# Patient Record
Sex: MALE | Race: WHITE | Employment: FULL TIME | URBAN - NONMETROPOLITAN AREA
[De-identification: names, ages, dates, MRNs, and addresses within clinical notes are randomized per-mention and may not be internally consistent; named-entity substitution may affect disease eponyms.]

---

## 2018-01-04 ENCOUNTER — HOSPITAL ENCOUNTER (EMERGENCY)
Age: 28
Discharge: HOME | End: 2018-01-04
Payer: MEDICARE

## 2018-01-04 VITALS
DIASTOLIC BLOOD PRESSURE: 84 MMHG | HEART RATE: 66 BPM | WEIGHT: 190 LBS | HEIGHT: 73 IN | TEMPERATURE: 98.4 F | BODY MASS INDEX: 25.18 KG/M2 | SYSTOLIC BLOOD PRESSURE: 144 MMHG | RESPIRATION RATE: 12 BRPM | OXYGEN SATURATION: 96 %

## 2018-01-04 DIAGNOSIS — R19.7 NAUSEA VOMITING AND DIARRHEA: ICD-10-CM

## 2018-01-04 DIAGNOSIS — K52.9 GASTROENTERITIS: Primary | ICD-10-CM

## 2018-01-04 DIAGNOSIS — R11.2 NAUSEA VOMITING AND DIARRHEA: ICD-10-CM

## 2018-01-04 PROCEDURE — 99203 OFFICE O/P NEW LOW 30 MIN: CPT | Performed by: NURSE PRACTITIONER

## 2018-01-04 PROCEDURE — 99203 OFFICE O/P NEW LOW 30 MIN: CPT

## 2018-01-04 RX ORDER — ONDANSETRON 4 MG/1
4 TABLET, FILM COATED ORAL EVERY 8 HOURS PRN
COMMUNITY
End: 2018-12-18 | Stop reason: ALTCHOICE

## 2018-01-04 ASSESSMENT — ENCOUNTER SYMPTOMS
SORE THROAT: 0
VOMITING: 1
NAUSEA: 1
RHINORRHEA: 0
DIARRHEA: 1
SINUS PRESSURE: 0
SHORTNESS OF BREATH: 0
ABDOMINAL PAIN: 1
CHEST TIGHTNESS: 0
COUGH: 0

## 2018-12-18 ENCOUNTER — HOSPITAL ENCOUNTER (EMERGENCY)
Age: 28
Discharge: HOME | End: 2018-12-18
Payer: MEDICARE

## 2018-12-18 VITALS
OXYGEN SATURATION: 96 % | SYSTOLIC BLOOD PRESSURE: 145 MMHG | HEART RATE: 124 BPM | BODY MASS INDEX: 29.16 KG/M2 | WEIGHT: 220 LBS | TEMPERATURE: 100.3 F | DIASTOLIC BLOOD PRESSURE: 91 MMHG | RESPIRATION RATE: 16 BRPM | HEIGHT: 73 IN

## 2018-12-18 DIAGNOSIS — J03.90 EXUDATIVE TONSILLITIS: Primary | ICD-10-CM

## 2018-12-18 LAB
GROUP A STREP CULTURE, REFLEX: NEGATIVE
REFLEX THROAT C + S: NORMAL

## 2018-12-18 PROCEDURE — 99213 OFFICE O/P EST LOW 20 MIN: CPT | Performed by: NURSE PRACTITIONER

## 2018-12-18 PROCEDURE — 87070 CULTURE OTHR SPECIMN AEROBIC: CPT

## 2018-12-18 PROCEDURE — 99213 OFFICE O/P EST LOW 20 MIN: CPT

## 2018-12-18 RX ORDER — AMOXICILLIN AND CLAVULANATE POTASSIUM 875; 125 MG/1; MG/1
1 TABLET, FILM COATED ORAL 2 TIMES DAILY
Qty: 20 TABLET | Refills: 0 | Status: SHIPPED | OUTPATIENT
Start: 2018-12-18 | End: 2018-12-28

## 2018-12-18 ASSESSMENT — ENCOUNTER SYMPTOMS
DIARRHEA: 0
WHEEZING: 0
SHORTNESS OF BREATH: 0
VOMITING: 0
SORE THROAT: 1
NAUSEA: 0

## 2018-12-18 ASSESSMENT — PAIN DESCRIPTION - FREQUENCY: FREQUENCY: CONTINUOUS

## 2018-12-18 ASSESSMENT — PAIN DESCRIPTION - DESCRIPTORS: DESCRIPTORS: SHARP

## 2018-12-18 ASSESSMENT — PAIN DESCRIPTION - PAIN TYPE: TYPE: ACUTE PAIN

## 2018-12-18 ASSESSMENT — PAIN DESCRIPTION - ONSET: ONSET: GRADUAL

## 2018-12-18 ASSESSMENT — PAIN DESCRIPTION - PROGRESSION: CLINICAL_PROGRESSION: GRADUALLY WORSENING

## 2018-12-18 ASSESSMENT — PAIN SCALES - GENERAL: PAINLEVEL_OUTOF10: 8

## 2018-12-18 ASSESSMENT — PAIN DESCRIPTION - LOCATION: LOCATION: THROAT

## 2018-12-18 NOTE — ED PROVIDER NOTES
edit the dictations but occasionally words are mis-transcribed.)         [place] Case, APRN - CNP  12/19/18 0020

## 2018-12-18 NOTE — LETTER
[facility]  [address]  Phone: [phone]               December 18, 2018    Patient: Fili Postal   YOB: 1990   Date of Visit: 12/18/2018       To Whom It May Concern:    Jw Smith was seen and treated in our emergency department on 12/18/2018. He may return to work on 12/20/18. Or sooner if no fever for 24 hours.       Sincerely,       Sergei Lux RN         Signature:__Electronically signed by Sergei Lux RN on 12/18/2018 at 6:15 PM  ________________________________

## 2018-12-19 ASSESSMENT — ENCOUNTER SYMPTOMS
COUGH: 0
SINUS PRESSURE: 0

## 2018-12-20 LAB — THROAT/NOSE CULTURE: NORMAL

## 2019-02-22 ENCOUNTER — HOSPITAL ENCOUNTER (EMERGENCY)
Age: 29
Discharge: HOME | End: 2019-02-22
Payer: MEDICARE

## 2019-02-22 VITALS
TEMPERATURE: 98 F | BODY MASS INDEX: 29.16 KG/M2 | RESPIRATION RATE: 16 BRPM | HEIGHT: 73 IN | WEIGHT: 220 LBS | DIASTOLIC BLOOD PRESSURE: 76 MMHG | SYSTOLIC BLOOD PRESSURE: 155 MMHG | HEART RATE: 88 BPM | OXYGEN SATURATION: 96 %

## 2019-02-22 DIAGNOSIS — J01.10 ACUTE FRONTAL SINUSITIS, RECURRENCE NOT SPECIFIED: ICD-10-CM

## 2019-02-22 DIAGNOSIS — J01.00 ACUTE MAXILLARY SINUSITIS, RECURRENCE NOT SPECIFIED: Primary | ICD-10-CM

## 2019-02-22 PROCEDURE — 99212 OFFICE O/P EST SF 10 MIN: CPT

## 2019-02-22 PROCEDURE — 99213 OFFICE O/P EST LOW 20 MIN: CPT | Performed by: NURSE PRACTITIONER

## 2019-02-22 RX ORDER — ACETAMINOPHEN 500 MG
1000 TABLET ORAL EVERY 6 HOURS PRN
COMMUNITY
End: 2019-07-01 | Stop reason: ALTCHOICE

## 2019-02-22 RX ORDER — PREDNISONE 20 MG/1
20 TABLET ORAL 2 TIMES DAILY
Qty: 10 TABLET | Refills: 0 | Status: SHIPPED | OUTPATIENT
Start: 2019-02-22 | End: 2019-02-27

## 2019-02-22 RX ORDER — AMOXICILLIN AND CLAVULANATE POTASSIUM 875; 125 MG/1; MG/1
1 TABLET, FILM COATED ORAL 2 TIMES DAILY
Qty: 14 TABLET | Refills: 0 | Status: SHIPPED | OUTPATIENT
Start: 2019-02-22 | End: 2019-03-01

## 2019-02-22 ASSESSMENT — ENCOUNTER SYMPTOMS
SORE THROAT: 1
NAUSEA: 0
SINUS PAIN: 1
SHORTNESS OF BREATH: 0
RHINORRHEA: 0
SINUS PRESSURE: 1
DIARRHEA: 0
COUGH: 1
VOMITING: 0

## 2019-02-22 ASSESSMENT — PAIN DESCRIPTION - LOCATION: LOCATION: THROAT

## 2019-02-22 ASSESSMENT — PAIN DESCRIPTION - PAIN TYPE: TYPE: ACUTE PAIN

## 2019-02-22 ASSESSMENT — PAIN DESCRIPTION - FREQUENCY: FREQUENCY: CONTINUOUS

## 2019-02-22 ASSESSMENT — PAIN DESCRIPTION - ONSET: ONSET: GRADUAL

## 2019-02-22 ASSESSMENT — PAIN DESCRIPTION - PROGRESSION: CLINICAL_PROGRESSION: NOT CHANGED

## 2019-02-22 ASSESSMENT — PAIN SCALES - GENERAL: PAINLEVEL_OUTOF10: 6

## 2019-02-22 ASSESSMENT — PAIN DESCRIPTION - DESCRIPTORS: DESCRIPTORS: SORE;ITCHING

## 2019-02-22 ASSESSMENT — PAIN - FUNCTIONAL ASSESSMENT: PAIN_FUNCTIONAL_ASSESSMENT: ACTIVITIES ARE NOT PREVENTED

## 2019-05-09 ENCOUNTER — HOSPITAL ENCOUNTER (EMERGENCY)
Age: 29
Discharge: HOME | End: 2019-05-09
Payer: MEDICARE

## 2019-05-09 VITALS
BODY MASS INDEX: 29.82 KG/M2 | HEIGHT: 73 IN | OXYGEN SATURATION: 98 % | RESPIRATION RATE: 16 BRPM | SYSTOLIC BLOOD PRESSURE: 123 MMHG | HEART RATE: 76 BPM | WEIGHT: 225 LBS | DIASTOLIC BLOOD PRESSURE: 76 MMHG | TEMPERATURE: 98.3 F

## 2019-05-09 DIAGNOSIS — J03.90 EXUDATIVE TONSILLITIS: Primary | ICD-10-CM

## 2019-05-09 LAB
GROUP A STREP CULTURE, REFLEX: NEGATIVE
REFLEX THROAT C + S: NORMAL

## 2019-05-09 PROCEDURE — 99213 OFFICE O/P EST LOW 20 MIN: CPT

## 2019-05-09 PROCEDURE — 99213 OFFICE O/P EST LOW 20 MIN: CPT | Performed by: NURSE PRACTITIONER

## 2019-05-09 PROCEDURE — 87880 STREP A ASSAY W/OPTIC: CPT

## 2019-05-09 PROCEDURE — 87070 CULTURE OTHR SPECIMN AEROBIC: CPT

## 2019-05-09 RX ORDER — AMOXICILLIN 500 MG/1
500 CAPSULE ORAL 2 TIMES DAILY
Qty: 20 CAPSULE | Refills: 0 | Status: SHIPPED | OUTPATIENT
Start: 2019-05-09 | End: 2019-05-19

## 2019-05-09 ASSESSMENT — ENCOUNTER SYMPTOMS
SINUS PAIN: 0
RHINORRHEA: 0
COUGH: 0
NAUSEA: 0
SINUS PRESSURE: 0
VOMITING: 0
SHORTNESS OF BREATH: 0
SORE THROAT: 1
DIARRHEA: 0

## 2019-05-09 NOTE — ED PROVIDER NOTES
[address]  Urgent Care Encounter       CHIEF COMPLAINT       Chief Complaint   Patient presents with    Pharyngitis       Nurses Notes reviewed and I agree except as noted in the HPI. HISTORY OF PRESENT ILLNESS   Darwin Valles is a 34 y.o. male who presents with complaints of sore throat for the past 2 days. Patient also reports subjective fever with body aches and chills. No reports of cough, sinus congestion, otalgia, nausea, vomiting or diarrhea. Appetite has been normal however he has decreased intake due to pain with swallowing. The history is provided by the patient. REVIEW OF SYSTEMS     Review of Systems   Constitutional: Positive for fever (Subjective). Negative for appetite change, chills and fatigue. HENT: Positive for sore throat. Negative for congestion, ear pain, rhinorrhea, sinus pressure, sinus pain and sneezing. Respiratory: Negative for cough and shortness of breath. Cardiovascular: Negative for chest pain. Gastrointestinal: Negative for diarrhea, nausea and vomiting. Musculoskeletal: Positive for myalgias. Neurological: Positive for headaches (Occasional). Negative for dizziness. PAST MEDICAL HISTORY   History reviewed. No pertinent past medical history. SURGICALHISTORY     Patient  has a past surgical history that includes Tibia fracture surgery and back surgery. CURRENT MEDICATIONS       Discharge Medication List as of 5/9/2019 11:06 AM      CONTINUE these medications which have NOT CHANGED    Details   Pseudoephedrine-APAP-DM (DAYQUIL PO) Take by mouthHistorical Med      acetaminophen (TYLENOL) 500 MG tablet Take 1,000 mg by mouth every 6 hours as needed for PainHistorical Med             ALLERGIES     Patient is is allergic to morphine. Patients   There is no immunization history on file for this patient. FAMILY HISTORY     Patient's family history is not on file.     SOCIAL HISTORY     Patient  reports that he has been smoking cigarettes. He has a 1.25 pack-year smoking history. He has never used smokeless tobacco. He reports that he drinks alcohol. He reports that he does not use drugs. PHYSICAL EXAM     ED TRIAGE VITALS  BP: 123/76, Temp: 98.3 °F (36.8 °C), Pulse: 76, Resp: 16, SpO2: 98 %,Estimated body mass index is 29.69 kg/m² as calculated from the following:    Height as of this encounter: 6' 1\" (1.854 m). Weight as of this encounter: 225 lb (102.1 kg). ,No LMP for male patient. Physical Exam   Constitutional: He is oriented to person, place, and time. Vital signs are normal. He appears well-developed and well-nourished. He is cooperative. He does not appear ill. HENT:   Head: Normocephalic and atraumatic. Right Ear: Hearing, tympanic membrane, external ear and ear canal normal.   Left Ear: Hearing, tympanic membrane, external ear and ear canal normal.   Nose: Nose normal. Right sinus exhibits no maxillary sinus tenderness and no frontal sinus tenderness. Left sinus exhibits no maxillary sinus tenderness and no frontal sinus tenderness. Mouth/Throat: Uvula is midline and mucous membranes are normal. Oropharyngeal exudate, posterior oropharyngeal edema and posterior oropharyngeal erythema present. No tonsillar abscesses. Tonsils are 3+ on the right. Tonsils are 3+ on the left. Tonsillar exudate. Neck: Neck supple. Cardiovascular: Normal rate, regular rhythm and normal heart sounds. Pulmonary/Chest: Effort normal and breath sounds normal. No respiratory distress. Lymphadenopathy:        Head (right side): No submental, no submandibular, no tonsillar, no preauricular, no posterior auricular and no occipital adenopathy present. Head (left side): No submental, no submandibular, no tonsillar, no preauricular, no posterior auricular and no occipital adenopathy present. He has no cervical adenopathy. Neurological: He is alert and oriented to person, place, and time. Skin: Skin is warm and dry. Psychiatric: He has a normal mood and affect. His speech is normal and behavior is normal.   Nursing note and vitals reviewed. DIAGNOSTIC RESULTS     Labs:  Results for orders placed or performed during the hospital encounter of 05/09/19   STREP A ANTIGEN   Result Value Ref Range    GROUP A STREP CULTURE, REFLEX NEGATIVE        IMAGING:    No orders to display         EKG:      URGENT CARE COURSE:     Vitals:    05/09/19 1037   BP: 123/76   Pulse: 76   Resp: 16   Temp: 98.3 °F (36.8 °C)   TempSrc: Temporal   SpO2: 98%   Weight: 225 lb (102.1 kg)   Height: 6' 1\" (1.854 m)       Medications - No data to display         PROCEDURES:  None    FINAL IMPRESSION      1. Exudative tonsillitis          DISPOSITION/ PLAN     Take antibiotics as prescribed until gone. Do not stop taking if even you start feeling better. Salt water gargles and/or throat spray/lozenges for pain relief. Do not share food, drinks, or utensils. Change tooth brush now and change again when symptoms are gone  Saline rinses for sinus congestion  Tylenol or Motrin for pain/fever/aches  Follow up with the [facility] [place] or with your family doctor in 3-4 days as needed if symptoms are not improving. Follow-up with family doctor to discuss possible ENT referral.    Patient has acute tonsillitis with exudate noted. Strep screen was negative. Patient will be treated with amoxicillin ×10 days. Follow-up with PCP in the next 3 days if not improved. Also discuss with PCP possible referral to ENT. Further instructions outlined above. All the patient's questions answered. The patient/parent agreed with the plan. The patient was discharged from the [facility] in good condition.         PATIENT REFERRED TO:  Jolie Fuentes MD  [address] / [place] [id number]      DISCHARGE MEDICATIONS:  Discharge Medication List as of 5/9/2019 11:06 AM      START taking these medications    Details   amoxicillin (AMOXIL) 500 MG capsule Take 1 capsule by mouth 2 times daily for 10 days, Disp-20 capsule, R-0Normal             Discharge Medication List as of 5/9/2019 11:06 AM          Discharge Medication List as of 5/9/2019 11:06 AM          Jessica Gui Cunningham, APRN - CNP    (Please note that portions of this note were completed with a voice recognition program. Efforts were made to edit the dictations but occasionally words are mis-transcribed.)         Jessica Gui Cunningham, APRN - CARMELA  05/09/19 1102

## 2019-05-11 LAB — THROAT/NOSE CULTURE: NORMAL

## 2019-07-01 ENCOUNTER — OFFICE VISIT (OUTPATIENT)
Dept: ENT CLINIC | Age: 29
End: 2019-07-01
Payer: MEDICARE

## 2019-07-01 VITALS
TEMPERATURE: 98.6 F | HEIGHT: 73 IN | HEART RATE: 76 BPM | BODY MASS INDEX: 29.94 KG/M2 | RESPIRATION RATE: 16 BRPM | WEIGHT: 225.9 LBS | SYSTOLIC BLOOD PRESSURE: 126 MMHG | DIASTOLIC BLOOD PRESSURE: 88 MMHG

## 2019-07-01 DIAGNOSIS — J35.8 TONSIL STONE: ICD-10-CM

## 2019-07-01 DIAGNOSIS — R19.6 HALITOSIS: ICD-10-CM

## 2019-07-01 DIAGNOSIS — J35.1 TONSILLAR HYPERTROPHY: ICD-10-CM

## 2019-07-01 DIAGNOSIS — J03.91 RECURRENT TONSILLITIS: Primary | ICD-10-CM

## 2019-07-01 PROCEDURE — 4004F PT TOBACCO SCREEN RCVD TLK: CPT | Performed by: NURSE PRACTITIONER

## 2019-07-01 PROCEDURE — G8427 DOCREV CUR MEDS BY ELIG CLIN: HCPCS | Performed by: NURSE PRACTITIONER

## 2019-07-01 PROCEDURE — 99204 OFFICE O/P NEW MOD 45 MIN: CPT | Performed by: NURSE PRACTITIONER

## 2019-07-01 PROCEDURE — G8419 CALC BMI OUT NRM PARAM NOF/U: HCPCS | Performed by: NURSE PRACTITIONER

## 2019-07-01 ASSESSMENT — ENCOUNTER SYMPTOMS
FACIAL SWELLING: 0
STRIDOR: 0
VOICE CHANGE: 0
SHORTNESS OF BREATH: 0
APNEA: 0
SORE THROAT: 0
SINUS PRESSURE: 0
VOMITING: 0
CHEST TIGHTNESS: 0
COLOR CHANGE: 0
CHOKING: 0
TROUBLE SWALLOWING: 0
WHEEZING: 0
DIARRHEA: 0
ABDOMINAL PAIN: 0
NAUSEA: 0
COUGH: 0
RHINORRHEA: 0

## 2019-07-02 ENCOUNTER — TELEPHONE (OUTPATIENT)
Dept: ENT CLINIC | Age: 29
End: 2019-07-02

## 2019-07-08 ENCOUNTER — TELEPHONE (OUTPATIENT)
Dept: ENT CLINIC | Age: 29
End: 2019-07-08

## 2019-07-10 DIAGNOSIS — Z01.818 PRE-OP TESTING: Primary | ICD-10-CM

## 2019-07-10 DIAGNOSIS — F17.200 SMOKER: ICD-10-CM

## 2019-07-11 ENCOUNTER — HOSPITAL ENCOUNTER (OUTPATIENT)
Age: 29
Discharge: HOME | End: 2019-07-11
Payer: MEDICARE

## 2019-07-11 ENCOUNTER — HOSPITAL ENCOUNTER (OUTPATIENT)
Dept: GENERAL RADIOLOGY | Age: 29
Discharge: HOME | End: 2019-07-11
Payer: MEDICARE

## 2019-07-11 DIAGNOSIS — F17.200 SMOKER: ICD-10-CM

## 2019-07-11 DIAGNOSIS — Z01.818 PRE-OP TESTING: ICD-10-CM

## 2019-07-11 LAB
BASOPHILS # BLD: 0.5 %
BASOPHILS ABSOLUTE: 0 THOU/MM3 (ref 0–0.1)
EOSINOPHIL # BLD: 2.6 %
EOSINOPHILS ABSOLUTE: 0.2 THOU/MM3 (ref 0–0.4)
ERYTHROCYTE [DISTWIDTH] IN BLOOD BY AUTOMATED COUNT: 12 % (ref 11.5–14.5)
ERYTHROCYTE [DISTWIDTH] IN BLOOD BY AUTOMATED COUNT: 38.6 FL (ref 35–45)
HCT VFR BLD CALC: 44.8 % (ref 42–52)
HEMOGLOBIN: 15.6 GM/DL (ref 14–18)
IMMATURE GRANS (ABS): 0.01 THOU/MM3 (ref 0–0.07)
IMMATURE GRANULOCYTES: 0.2 %
LYMPHOCYTES # BLD: 27.9 %
LYMPHOCYTES ABSOLUTE: 1.8 THOU/MM3 (ref 1–4.8)
MCH RBC QN AUTO: 30.8 PG (ref 26–33)
MCHC RBC AUTO-ENTMCNC: 34.8 GM/DL (ref 32.2–35.5)
MCV RBC AUTO: 88.5 FL (ref 80–94)
MONOCYTES # BLD: 6.6 %
MONOCYTES ABSOLUTE: 0.4 THOU/MM3 (ref 0.4–1.3)
NUCLEATED RED BLOOD CELLS: 0 /100 WBC
PLATELET # BLD: 209 THOU/MM3 (ref 130–400)
PMV BLD AUTO: 10.8 FL (ref 9.4–12.4)
RBC # BLD: 5.06 MILL/MM3 (ref 4.7–6.1)
SEG NEUTROPHILS: 62.2 %
SEGMENTED NEUTROPHILS ABSOLUTE COUNT: 4.1 THOU/MM3 (ref 1.8–7.7)
WBC # BLD: 6.6 THOU/MM3 (ref 4.8–10.8)

## 2019-07-11 PROCEDURE — 85025 COMPLETE CBC W/AUTO DIFF WBC: CPT

## 2019-07-11 PROCEDURE — 36415 COLL VENOUS BLD VENIPUNCTURE: CPT

## 2019-07-11 PROCEDURE — 71046 X-RAY EXAM CHEST 2 VIEWS: CPT

## 2019-07-12 ENCOUNTER — OFFICE VISIT (OUTPATIENT)
Dept: ENT CLINIC | Age: 29
End: 2019-07-12

## 2019-07-12 VITALS
BODY MASS INDEX: 29.69 KG/M2 | DIASTOLIC BLOOD PRESSURE: 70 MMHG | HEART RATE: 80 BPM | HEIGHT: 73 IN | TEMPERATURE: 98.4 F | RESPIRATION RATE: 14 BRPM | SYSTOLIC BLOOD PRESSURE: 122 MMHG | WEIGHT: 224 LBS

## 2019-07-12 DIAGNOSIS — J03.91 RECURRENT TONSILLITIS: ICD-10-CM

## 2019-07-12 DIAGNOSIS — J35.1 TONSILLAR HYPERTROPHY: Primary | ICD-10-CM

## 2019-07-12 DIAGNOSIS — R19.6 HALITOSIS: ICD-10-CM

## 2019-07-12 DIAGNOSIS — F17.200 SMOKER: ICD-10-CM

## 2019-07-12 DIAGNOSIS — J35.8 TONSIL STONE: ICD-10-CM

## 2019-07-12 PROCEDURE — 99999 PR OFFICE/OUTPT VISIT,PROCEDURE ONLY: CPT | Performed by: OTOLARYNGOLOGY

## 2019-07-12 ASSESSMENT — ENCOUNTER SYMPTOMS
FACIAL SWELLING: 0
VOICE CHANGE: 0
RHINORRHEA: 0
CHEST TIGHTNESS: 0
VOMITING: 0
STRIDOR: 0
TROUBLE SWALLOWING: 0
CHOKING: 0
COUGH: 0
APNEA: 0
COLOR CHANGE: 0
SINUS PRESSURE: 0
ABDOMINAL PAIN: 0
WHEEZING: 0
DIARRHEA: 0
SORE THROAT: 0
SHORTNESS OF BREATH: 0
NAUSEA: 0

## 2019-07-12 NOTE — PROGRESS NOTES
family history,social history, allergies and current medications were reviewed with the patient. Assessment & Plan   Diagnoses and all orders for this visit:     Diagnosis Orders   1. Tonsillar hypertrophy     2. Recurrent tonsillitis     3. Tonsil stone     4. Halitosis     5. Smoker       The findings were explained and his questions were answered. Options were discussed including surgery to clear his airway. Probiotics were discussed in detail to take over the counter. He agreed. Benefits and risks are discussed, along with alternatives, and their questions were answered. No guarantees were made. They request we proceed. I, Samir Nunez CMA ([facility]), am scribing for, and in the presence of Dr. Scott Fallon. Electronically signed by Amber Thompson CMA ([facility]) on 7/12/19 at 2:32 PM.     (Please note that portions of this note were completed with a voice recognition program. Efforts were made to edit the dictations butoccasionally words are mis-transcribed.)    I agree to the above documentation placed by my scribe. I have personally evaluated this patient. Additional findings are as noted. I reviewed the scribe's note and agree with the documented findings and plan of care. Any areas of disagreement are corrected. I agree with the chief complaint, past medical history, past surgical history, allergies, medications, social and family history as documented unless otherwise noted below.      Electronically signed by Caitie Hart MD on 7/21/2019 at 5:26 PM

## 2019-07-15 NOTE — PROGRESS NOTES
PAT call attempted patient unavailable left message with instructions    NPO after midnight  Bring insurance info and drivers license  Wear comfortable clean clothing  Do not bring jewelry   Shower night before and morning of surgery with a liquid antibacterial soap  Bring list of medications with dosage and how often taken  Follow all instructions given by your physician   needed at discharge  Call [phone] for any questions

## 2019-07-18 ENCOUNTER — OFFICE VISIT (OUTPATIENT)
Dept: ALLERGY | Age: 29
End: 2019-07-18
Payer: MEDICARE

## 2019-07-18 ENCOUNTER — NURSE ONLY (OUTPATIENT)
Dept: LAB | Age: 29
End: 2019-07-18

## 2019-07-18 VITALS
DIASTOLIC BLOOD PRESSURE: 86 MMHG | SYSTOLIC BLOOD PRESSURE: 118 MMHG | WEIGHT: 224.5 LBS | HEART RATE: 68 BPM | HEIGHT: 73 IN | RESPIRATION RATE: 12 BRPM | BODY MASS INDEX: 29.75 KG/M2

## 2019-07-18 DIAGNOSIS — T63.91XA TOXIC EFFECT OF VENOM, ACCIDENTAL OR UNINTENTIONAL, INITIAL ENCOUNTER: ICD-10-CM

## 2019-07-18 DIAGNOSIS — J30.9 ALLERGIC RHINOCONJUNCTIVITIS: ICD-10-CM

## 2019-07-18 DIAGNOSIS — Z91.09 POLLEN ALLERGIES: ICD-10-CM

## 2019-07-18 DIAGNOSIS — J30.9 ALLERGIC RHINOCONJUNCTIVITIS: Primary | ICD-10-CM

## 2019-07-18 DIAGNOSIS — H10.10 SEASONAL AND PERENNIAL ALLERGIC RHINOCONJUNCTIVITIS: ICD-10-CM

## 2019-07-18 DIAGNOSIS — J30.1 ALLERGY TO TREES: ICD-10-CM

## 2019-07-18 DIAGNOSIS — J30.81 ALLERGY TO DOG DANDER: ICD-10-CM

## 2019-07-18 DIAGNOSIS — H10.10 ALLERGIC RHINOCONJUNCTIVITIS: Primary | ICD-10-CM

## 2019-07-18 DIAGNOSIS — Z91.038 ALLERGY TO COCKROACHES: ICD-10-CM

## 2019-07-18 DIAGNOSIS — J30.89 SEASONAL AND PERENNIAL ALLERGIC RHINOCONJUNCTIVITIS: ICD-10-CM

## 2019-07-18 DIAGNOSIS — H10.10 ALLERGIC RHINOCONJUNCTIVITIS: ICD-10-CM

## 2019-07-18 DIAGNOSIS — J30.2 SEASONAL AND PERENNIAL ALLERGIC RHINOCONJUNCTIVITIS: ICD-10-CM

## 2019-07-18 LAB
BASOPHILS # BLD: 0.7 %
BASOPHILS ABSOLUTE: 0 THOU/MM3 (ref 0–0.1)
EOSINOPHIL # BLD: 2.2 %
EOSINOPHILS ABSOLUTE: 0.1 THOU/MM3 (ref 0–0.4)
ERYTHROCYTE [DISTWIDTH] IN BLOOD BY AUTOMATED COUNT: 11.7 % (ref 11.5–14.5)
ERYTHROCYTE [DISTWIDTH] IN BLOOD BY AUTOMATED COUNT: 37.3 FL (ref 35–45)
HCT VFR BLD CALC: 44.2 % (ref 42–52)
HEMOGLOBIN: 15.5 GM/DL (ref 14–18)
IMMATURE GRANS (ABS): 0.01 THOU/MM3 (ref 0–0.07)
IMMATURE GRANULOCYTES: 0.2 %
LYMPHOCYTES # BLD: 27.7 %
LYMPHOCYTES ABSOLUTE: 1.5 THOU/MM3 (ref 1–4.8)
MCH RBC QN AUTO: 30.5 PG (ref 26–33)
MCHC RBC AUTO-ENTMCNC: 35.1 GM/DL (ref 32.2–35.5)
MCV RBC AUTO: 87 FL (ref 80–94)
MONOCYTES # BLD: 6.1 %
MONOCYTES ABSOLUTE: 0.3 THOU/MM3 (ref 0.4–1.3)
NUCLEATED RED BLOOD CELLS: 0 /100 WBC
PLATELET # BLD: 196 THOU/MM3 (ref 130–400)
PMV BLD AUTO: 10.6 FL (ref 9.4–12.4)
RBC # BLD: 5.08 MILL/MM3 (ref 4.7–6.1)
SEG NEUTROPHILS: 63.1 %
SEGMENTED NEUTROPHILS ABSOLUTE COUNT: 3.5 THOU/MM3 (ref 1.8–7.7)
WBC # BLD: 5.5 THOU/MM3 (ref 4.8–10.8)

## 2019-07-18 PROCEDURE — G8419 CALC BMI OUT NRM PARAM NOF/U: HCPCS | Performed by: NURSE PRACTITIONER

## 2019-07-18 PROCEDURE — 1036F TOBACCO NON-USER: CPT | Performed by: NURSE PRACTITIONER

## 2019-07-18 PROCEDURE — 99204 OFFICE O/P NEW MOD 45 MIN: CPT | Performed by: NURSE PRACTITIONER

## 2019-07-18 PROCEDURE — 95004 PERQ TESTS W/ALRGNC XTRCS: CPT | Performed by: NURSE PRACTITIONER

## 2019-07-18 PROCEDURE — G8427 DOCREV CUR MEDS BY ELIG CLIN: HCPCS | Performed by: NURSE PRACTITIONER

## 2019-07-18 RX ORDER — MONTELUKAST SODIUM 10 MG/1
10 TABLET ORAL NIGHTLY
Qty: 30 TABLET | Refills: 0 | Status: SHIPPED | OUTPATIENT
Start: 2019-07-18 | End: 2019-08-08 | Stop reason: SDUPTHER

## 2019-07-18 RX ORDER — LEVOCETIRIZINE DIHYDROCHLORIDE 5 MG/1
5 TABLET, FILM COATED ORAL NIGHTLY
Qty: 30 TABLET | Refills: 11 | COMMUNITY
Start: 2019-07-18 | End: 2021-07-01

## 2019-07-18 RX ORDER — TRIAMCINOLONE ACETONIDE 55 UG/1
2 SPRAY, METERED NASAL DAILY
Qty: 1 INHALER | Refills: 11 | Status: ON HOLD | COMMUNITY
Start: 2019-07-18 | End: 2019-07-22 | Stop reason: HOSPADM

## 2019-07-18 RX ORDER — EPINEPHRINE 0.3 MG/.3ML
INJECTION SUBCUTANEOUS
Qty: 4 EACH | Refills: 2 | Status: ON HOLD | OUTPATIENT
Start: 2019-07-18 | End: 2019-07-22 | Stop reason: HOSPADM

## 2019-07-18 ASSESSMENT — ENCOUNTER SYMPTOMS
CONSTIPATION: 0
STRIDOR: 0
SINUS PAIN: 0
EYE DISCHARGE: 0
EYE PAIN: 0
EYE REDNESS: 1
VOMITING: 0
SORE THROAT: 0
SINUS PRESSURE: 1
COUGH: 0
BACK PAIN: 0
EYE ITCHING: 1
SHORTNESS OF BREATH: 0
DIARRHEA: 0
RHINORRHEA: 1
NAUSEA: 0
WHEEZING: 0
ABDOMINAL PAIN: 0

## 2019-07-18 NOTE — PROGRESS NOTES
nasal inhaler 2 sprays by Each Nostril route daily Yes Soraidadeibis RoqueGOSIA CNP   EPINEPHrine (AUVI-Q) 0.3 MG/0.3ML SOAJ injection Dispense 2 packs of 2 (total 4 devices). Use as directed, STAT for allergic reaction. Yes Lupe Roque, APRN - CNP   Multiple Vitamins-Minerals (MULTIVITAMIN PO) Take by mouth daily  Historical Provider, MD       Vitals:  Vitals:    07/18/19 1016   BP: 118/86   Pulse: 68   Resp: 12       Physical Exam:  Physical Exam   Constitutional: He appears well-developed and well-nourished. HENT:   Head: Normocephalic and atraumatic. Positive cobblestoning, bilateral tympanic membranes hazy. Posterior pharynx pale. Inferior turbinates pale moist and boggy   Eyes: Pupils are equal, round, and reactive to light. EOM are normal.   Sclera injected   Neck: Normal range of motion. Neck supple. Cardiovascular: Normal rate, regular rhythm and normal heart sounds. Pulmonary/Chest: Effort normal and breath sounds normal.   Musculoskeletal: Normal range of motion. Skin: Skin is warm and dry. Psychiatric: He has a normal mood and affect. His behavior is normal. Judgment and thought content normal.   Nursing note and vitals reviewed.       Metal Plates no        DATA:  Lab Review:   Results for orders placed or performed during the hospital encounter of 07/11/19   CBC Auto Differential   Result Value Ref Range    WBC 6.6 4.8 - 10.8 thou/mm3    RBC 5.06 4.70 - 6.10 mill/mm3    Hemoglobin 15.6 14.0 - 18.0 gm/dl    Hematocrit 44.8 42.0 - 52.0 %    MCV 88.5 80.0 - 94.0 fL    MCH 30.8 26.0 - 33.0 pg    MCHC 34.8 32.2 - 35.5 gm/dl    RDW-CV 12.0 11.5 - 14.5 %    RDW-SD 38.6 35.0 - 45.0 fL    Platelets 299 686 - 461 thou/mm3    MPV 10.8 9.4 - 12.4 fL    Seg Neutrophils 62.2 %    Lymphocytes 27.9 %    Monocytes 6.6 %    Eosinophils 2.6 %    Basophils 0.5 %    Immature Granulocytes 0.2 %    Segs Absolute 4.1 1.8 - 7.7 thou/mm3    Lymphocytes # 1.8 1.0 - 4.8 thou/mm3    Monocytes # 0.4 0.4 - 1.3 thou/mm3

## 2019-07-19 LAB
IGA: 224 MG/DL (ref 70–400)
IGE: 44 IU/ML
IGG: 1470 MG/DL (ref 700–1600)
IGM: 72 MG/DL (ref 40–230)

## 2019-07-20 LAB — TRYPTASE: NORMAL

## 2019-07-21 PROBLEM — J35.1 TONSILLAR HYPERTROPHY: Status: ACTIVE | Noted: 2019-07-21

## 2019-07-21 PROBLEM — R19.6 HALITOSIS: Status: ACTIVE | Noted: 2019-07-21

## 2019-07-21 PROBLEM — J35.8 TONSIL STONE: Status: ACTIVE | Noted: 2019-07-21

## 2019-07-21 PROBLEM — J03.91 RECURRENT TONSILLITIS: Status: ACTIVE | Noted: 2019-07-21

## 2019-07-21 PROBLEM — F17.200 SMOKER: Status: ACTIVE | Noted: 2019-07-21

## 2019-07-21 LAB — MISC. #1 REFERENCE GROUP TEST: NORMAL

## 2019-07-21 NOTE — H&P
[address] EAR, NOSE AND THROAT  [facility]  [facility] [place] [address]  Dept: [phone]  Dept Fax: [phone]  Loc: [phone]     Leigha Frias is a 34 y.o. male who was referred byNo ref. provider found for:       Chief Complaint   Patient presents with    Pre-op Exam       Patient is here for pre-op tonsillectomy 7/22/19   .     HPI:      Leigha Frias is a 34 y.o. male who presents today for pre-op tonsillectomy on 7/22/19.       New patient here for evaluation of his tonsils.  He reports many tonsil infections over the years, usually 1-2 per year.  Most recent infection 2 months ago was the [place] Braxtonr had an exudative tonsillitis that persisted despite Amoxil.  Strep was negative.  Was then given course of Augmentin and prednisone.  Finally improved, but tonsils still feel large.  He gets tonsil debris and frequent sore throats.  He has bad breath despite good oral hygiene.       He does get sinus infections.  Thinks he has allergies, but has never been tested.  Not on any treatment. Annika Redmond feels his symptoms are seasonal and trigger the infections.       He had 3-4 tonsillitis infections for for 3 years. He has had tonsil stones. The last time he got sick the tonsils were swollen and the swelling has not gone down. He thinks  he has been prescribed amoxicillin-clav. In the past.       He is tired of taking the antibiotics. It would help the infection but hurt his throat.      He is an athlete, football, basketball, baseball. He is working in [employer]. History:           Allergies   Allergen Reactions    Morphine Nausea Only      Current Facility-Administered Medications          Current Outpatient Medications   Medication Sig Dispense Refill    Multiple Vitamins-Minerals (MULTIVITAMIN PO) Take by mouth daily          No current facility-administered medications for this visit.          Past Medical History   History reviewed.  No disturbance. The patient is not nervous/anxious.          Objective:      /70 (Site: Left Upper Arm, Position: Sitting)   Pulse 80   Temp 98.4 °F (36.9 °C) (Oral)   Resp 14   Ht 6' 1\" (1.854 m)   Wt 224 lb (101.6 kg)   BMI 29.55 kg/m²      Physical Exam   Constitutional: He is oriented to person, place, and time. He appears well-developed and well-nourished. He is cooperative. HENT:   Head: Normocephalic and atraumatic. Head is without laceration. Right Ear: Hearing, external ear and ear canal normal. No drainage or swelling. Tympanic membrane is not perforated and not erythematous. No middle ear effusion. Left Ear: Hearing, tympanic membrane, external ear and ear canal normal. No drainage or swelling. Tympanic membrane is not perforated and not erythematous. No middle ear effusion. Nose: Nose normal. No mucosal edema, rhinorrhea or septal deviation. Mouth/Throat: Uvula is midline, oropharynx is clear and moist and mucous membranes are normal. Mucous membranes are not pale and not dry. No oral lesions. No uvula swelling. No oropharyngeal exudate, posterior oropharyngeal edema or posterior oropharyngeal erythema. Turbinates: normal  LIps: lips normal    Teeth and gums:good dentition   Mallampati 1  Tonsils:4+ right, 3+ left  Base of tongue: symmetric,  Larynx, mirror exam: unable due to gag reflex     Eyes: Right eye exhibits normal extraocular motion and no nystagmus. Left eye exhibits normal extraocular motion and no nystagmus. Conjugate gaze   Neck: Trachea normal and phonation normal. Neck supple. No tracheal deviation present. No thyroid mass and no thyromegaly present. No adenopathy. Salivary glands not enlarged and normal to palpation     Cardiovascular: Normal rate and regular rhythm. No murmur heard. Pulmonary/Chest: Effort normal and breath sounds normal. No stridor. Chest wall is not dull to percussion. Neurological: He is alert and oriented to person, place, and time.  No

## 2019-07-22 ENCOUNTER — ANESTHESIA (OUTPATIENT)
Dept: OPERATING ROOM | Age: 29
End: 2019-07-22
Payer: MEDICARE

## 2019-07-22 ENCOUNTER — ANESTHESIA EVENT (OUTPATIENT)
Dept: OPERATING ROOM | Age: 29
End: 2019-07-22
Payer: MEDICARE

## 2019-07-22 ENCOUNTER — TELEPHONE (OUTPATIENT)
Dept: ENT CLINIC | Age: 29
End: 2019-07-22

## 2019-07-22 ENCOUNTER — HOSPITAL ENCOUNTER (OUTPATIENT)
Age: 29
Setting detail: OUTPATIENT SURGERY
Discharge: HOME | End: 2019-07-22
Attending: OTOLARYNGOLOGY | Admitting: OTOLARYNGOLOGY
Payer: MEDICARE

## 2019-07-22 VITALS
OXYGEN SATURATION: 95 % | DIASTOLIC BLOOD PRESSURE: 97 MMHG | HEART RATE: 49 BPM | RESPIRATION RATE: 15 BRPM | SYSTOLIC BLOOD PRESSURE: 152 MMHG | TEMPERATURE: 97.2 F | HEIGHT: 73 IN | WEIGHT: 226.2 LBS | BODY MASS INDEX: 29.98 KG/M2

## 2019-07-22 VITALS
TEMPERATURE: 98.6 F | OXYGEN SATURATION: 98 % | RESPIRATION RATE: 1 BRPM | SYSTOLIC BLOOD PRESSURE: 115 MMHG | DIASTOLIC BLOOD PRESSURE: 69 MMHG

## 2019-07-22 DIAGNOSIS — F17.200 SMOKER: ICD-10-CM

## 2019-07-22 DIAGNOSIS — R19.6 HALITOSIS: ICD-10-CM

## 2019-07-22 DIAGNOSIS — J35.8 TONSIL STONE: ICD-10-CM

## 2019-07-22 DIAGNOSIS — J03.91 RECURRENT TONSILLITIS: ICD-10-CM

## 2019-07-22 DIAGNOSIS — J35.1 TONSILLAR HYPERTROPHY: Primary | ICD-10-CM

## 2019-07-22 PROCEDURE — 7100000010 HC PHASE II RECOVERY - FIRST 15 MIN: Performed by: OTOLARYNGOLOGY

## 2019-07-22 PROCEDURE — 7100000000 HC PACU RECOVERY - FIRST 15 MIN: Performed by: OTOLARYNGOLOGY

## 2019-07-22 PROCEDURE — 3600000012 HC SURGERY LEVEL 2 ADDTL 15MIN: Performed by: OTOLARYNGOLOGY

## 2019-07-22 PROCEDURE — 2709999900 HC NON-CHARGEABLE SUPPLY: Performed by: OTOLARYNGOLOGY

## 2019-07-22 PROCEDURE — 3700000000 HC ANESTHESIA ATTENDED CARE: Performed by: OTOLARYNGOLOGY

## 2019-07-22 PROCEDURE — 7100000011 HC PHASE II RECOVERY - ADDTL 15 MIN: Performed by: OTOLARYNGOLOGY

## 2019-07-22 PROCEDURE — 2500000003 HC RX 250 WO HCPCS: Performed by: NURSE ANESTHETIST, CERTIFIED REGISTERED

## 2019-07-22 PROCEDURE — 6370000000 HC RX 637 (ALT 250 FOR IP): Performed by: OTOLARYNGOLOGY

## 2019-07-22 PROCEDURE — 3600000002 HC SURGERY LEVEL 2 BASE: Performed by: OTOLARYNGOLOGY

## 2019-07-22 PROCEDURE — 6360000002 HC RX W HCPCS: Performed by: NURSE ANESTHETIST, CERTIFIED REGISTERED

## 2019-07-22 PROCEDURE — 7100000001 HC PACU RECOVERY - ADDTL 15 MIN: Performed by: OTOLARYNGOLOGY

## 2019-07-22 PROCEDURE — 2580000003 HC RX 258: Performed by: NURSE ANESTHETIST, CERTIFIED REGISTERED

## 2019-07-22 PROCEDURE — 6360000002 HC RX W HCPCS: Performed by: ANESTHESIOLOGY

## 2019-07-22 PROCEDURE — 88304 TISSUE EXAM BY PATHOLOGIST: CPT

## 2019-07-22 PROCEDURE — 3700000001 HC ADD 15 MINUTES (ANESTHESIA): Performed by: OTOLARYNGOLOGY

## 2019-07-22 PROCEDURE — 42826 REMOVAL OF TONSILS: CPT | Performed by: OTOLARYNGOLOGY

## 2019-07-22 PROCEDURE — 6360000002 HC RX W HCPCS: Performed by: OTOLARYNGOLOGY

## 2019-07-22 RX ORDER — ROCURONIUM BROMIDE 10 MG/ML
INJECTION, SOLUTION INTRAVENOUS PRN
Status: DISCONTINUED | OUTPATIENT
Start: 2019-07-22 | End: 2019-07-22 | Stop reason: SDUPTHER

## 2019-07-22 RX ORDER — HYDROXYZINE HCL 10 MG/5 ML
15 SOLUTION, ORAL ORAL EVERY 4 HOURS PRN
Qty: 240 ML | Refills: 1 | Status: SHIPPED | OUTPATIENT
Start: 2019-07-22 | End: 2019-08-08 | Stop reason: ALTCHOICE

## 2019-07-22 RX ORDER — LABETALOL 20 MG/4 ML (5 MG/ML) INTRAVENOUS SYRINGE
10 EVERY 10 MIN PRN
Status: DISCONTINUED | OUTPATIENT
Start: 2019-07-22 | End: 2019-07-22 | Stop reason: HOSPADM

## 2019-07-22 RX ORDER — FENTANYL CITRATE 50 UG/ML
50 INJECTION, SOLUTION INTRAMUSCULAR; INTRAVENOUS EVERY 5 MIN PRN
Status: DISCONTINUED | OUTPATIENT
Start: 2019-07-22 | End: 2019-07-22 | Stop reason: HOSPADM

## 2019-07-22 RX ORDER — PROMETHAZINE HYDROCHLORIDE 25 MG/ML
12.5 INJECTION, SOLUTION INTRAMUSCULAR; INTRAVENOUS
Status: COMPLETED | OUTPATIENT
Start: 2019-07-22 | End: 2019-07-22

## 2019-07-22 RX ORDER — FENTANYL CITRATE 50 UG/ML
INJECTION, SOLUTION INTRAMUSCULAR; INTRAVENOUS PRN
Status: DISCONTINUED | OUTPATIENT
Start: 2019-07-22 | End: 2019-07-22 | Stop reason: SDUPTHER

## 2019-07-22 RX ORDER — OXYMETAZOLINE HYDROCHLORIDE 0.05 G/100ML
SPRAY NASAL PRN
Status: DISCONTINUED | OUTPATIENT
Start: 2019-07-22 | End: 2019-07-22 | Stop reason: ALTCHOICE

## 2019-07-22 RX ORDER — HYDROXYZINE HCL 10 MG/5 ML
15 SOLUTION, ORAL ORAL EVERY 4 HOURS PRN
Status: DISCONTINUED | OUTPATIENT
Start: 2019-07-22 | End: 2019-07-22 | Stop reason: HOSPADM

## 2019-07-22 RX ORDER — GLYCOPYRROLATE 1 MG/5 ML
SYRINGE (ML) INTRAVENOUS PRN
Status: DISCONTINUED | OUTPATIENT
Start: 2019-07-22 | End: 2019-07-22 | Stop reason: SDUPTHER

## 2019-07-22 RX ORDER — NEOSTIGMINE METHYLSULFATE 5 MG/5 ML
SYRINGE (ML) INTRAVENOUS PRN
Status: DISCONTINUED | OUTPATIENT
Start: 2019-07-22 | End: 2019-07-22 | Stop reason: SDUPTHER

## 2019-07-22 RX ORDER — FENTANYL CITRATE 50 UG/ML
25 INJECTION, SOLUTION INTRAMUSCULAR; INTRAVENOUS EVERY 5 MIN PRN
Status: DISCONTINUED | OUTPATIENT
Start: 2019-07-22 | End: 2019-07-22 | Stop reason: HOSPADM

## 2019-07-22 RX ORDER — DEXAMETHASONE SODIUM PHOSPHATE 4 MG/ML
INJECTION, SOLUTION INTRA-ARTICULAR; INTRALESIONAL; INTRAMUSCULAR; INTRAVENOUS; SOFT TISSUE PRN
Status: DISCONTINUED | OUTPATIENT
Start: 2019-07-22 | End: 2019-07-22 | Stop reason: SDUPTHER

## 2019-07-22 RX ORDER — PROPOFOL 10 MG/ML
INJECTION, EMULSION INTRAVENOUS PRN
Status: DISCONTINUED | OUTPATIENT
Start: 2019-07-22 | End: 2019-07-22 | Stop reason: SDUPTHER

## 2019-07-22 RX ORDER — AMOXICILLIN 400 MG/5ML
800 POWDER, FOR SUSPENSION ORAL 2 TIMES DAILY
Qty: 200 ML | Refills: 0 | Status: SHIPPED | OUTPATIENT
Start: 2019-07-22 | End: 2019-08-01

## 2019-07-22 RX ORDER — HYDROCODONE BITARTRATE AND ACETAMINOPHEN 5; 217 MG/10ML; MG/10ML
15 SOLUTION ORAL EVERY 4 HOURS PRN
Status: DISCONTINUED | OUTPATIENT
Start: 2019-07-22 | End: 2019-07-22 | Stop reason: HOSPADM

## 2019-07-22 RX ORDER — SODIUM CHLORIDE 9 MG/ML
INJECTION, SOLUTION INTRAVENOUS CONTINUOUS PRN
Status: DISCONTINUED | OUTPATIENT
Start: 2019-07-22 | End: 2019-07-22 | Stop reason: SDUPTHER

## 2019-07-22 RX ORDER — ROPIVACAINE HYDROCHLORIDE 5 MG/ML
INJECTION, SOLUTION EPIDURAL; INFILTRATION; PERINEURAL PRN
Status: DISCONTINUED | OUTPATIENT
Start: 2019-07-22 | End: 2019-07-22 | Stop reason: ALTCHOICE

## 2019-07-22 RX ORDER — LIDOCAINE HYDROCHLORIDE 20 MG/ML
INJECTION, SOLUTION EPIDURAL; INFILTRATION; INTRACAUDAL; PERINEURAL PRN
Status: DISCONTINUED | OUTPATIENT
Start: 2019-07-22 | End: 2019-07-22 | Stop reason: SDUPTHER

## 2019-07-22 RX ORDER — ONDANSETRON 2 MG/ML
INJECTION INTRAMUSCULAR; INTRAVENOUS PRN
Status: DISCONTINUED | OUTPATIENT
Start: 2019-07-22 | End: 2019-07-22 | Stop reason: SDUPTHER

## 2019-07-22 RX ORDER — MIDAZOLAM HYDROCHLORIDE 1 MG/ML
INJECTION INTRAMUSCULAR; INTRAVENOUS PRN
Status: DISCONTINUED | OUTPATIENT
Start: 2019-07-22 | End: 2019-07-22 | Stop reason: SDUPTHER

## 2019-07-22 RX ADMIN — SODIUM CHLORIDE: 9 INJECTION, SOLUTION INTRAVENOUS at 09:39

## 2019-07-22 RX ADMIN — FENTANYL CITRATE 50 MCG: 50 INJECTION INTRAMUSCULAR; INTRAVENOUS at 10:10

## 2019-07-22 RX ADMIN — FENTANYL CITRATE 50 MCG: 50 INJECTION INTRAMUSCULAR; INTRAVENOUS at 11:43

## 2019-07-22 RX ADMIN — MIDAZOLAM HYDROCHLORIDE 2 MG: 1 INJECTION, SOLUTION INTRAMUSCULAR; INTRAVENOUS at 09:40

## 2019-07-22 RX ADMIN — Medication 0.6 MG: at 11:04

## 2019-07-22 RX ADMIN — FENTANYL CITRATE 50 MCG: 50 INJECTION INTRAMUSCULAR; INTRAVENOUS at 11:11

## 2019-07-22 RX ADMIN — FENTANYL CITRATE 50 MCG: 50 INJECTION INTRAMUSCULAR; INTRAVENOUS at 11:28

## 2019-07-22 RX ADMIN — Medication 4 MG: at 11:04

## 2019-07-22 RX ADMIN — SODIUM CHLORIDE: 9 INJECTION, SOLUTION INTRAVENOUS at 10:43

## 2019-07-22 RX ADMIN — FENTANYL CITRATE 100 MCG: 50 INJECTION INTRAMUSCULAR; INTRAVENOUS at 09:41

## 2019-07-22 RX ADMIN — Medication 15 MG: at 12:51

## 2019-07-22 RX ADMIN — HYDROCODONE BITARTRATE AND ACETAMINOPHEN 15 ML: 5; 217 SOLUTION ORAL at 12:52

## 2019-07-22 RX ADMIN — PROPOFOL 200 MG: 10 INJECTION, EMULSION INTRAVENOUS at 09:47

## 2019-07-22 RX ADMIN — DEXAMETHASONE SODIUM PHOSPHATE 15 MG: 4 INJECTION, SOLUTION INTRAMUSCULAR; INTRAVENOUS at 10:40

## 2019-07-22 RX ADMIN — ONDANSETRON HYDROCHLORIDE 4 MG: 4 INJECTION, SOLUTION INTRAMUSCULAR; INTRAVENOUS at 10:41

## 2019-07-22 RX ADMIN — PROMETHAZINE HYDROCHLORIDE 12.5 MG: 25 INJECTION INTRAMUSCULAR; INTRAVENOUS at 12:32

## 2019-07-22 RX ADMIN — LIDOCAINE HYDROCHLORIDE 40 MG: 20 INJECTION, SOLUTION EPIDURAL; INFILTRATION; INTRACAUDAL; PERINEURAL at 09:47

## 2019-07-22 RX ADMIN — ROCURONIUM BROMIDE 50 MG: 10 INJECTION INTRAVENOUS at 09:47

## 2019-07-22 ASSESSMENT — PULMONARY FUNCTION TESTS
PIF_VALUE: 18
PIF_VALUE: 5
PIF_VALUE: 18
PIF_VALUE: 19
PIF_VALUE: 25
PIF_VALUE: 18
PIF_VALUE: 9
PIF_VALUE: 19
PIF_VALUE: 19
PIF_VALUE: 18
PIF_VALUE: 19
PIF_VALUE: 19
PIF_VALUE: 18
PIF_VALUE: 18
PIF_VALUE: 19
PIF_VALUE: 19
PIF_VALUE: 18
PIF_VALUE: 20
PIF_VALUE: 18
PIF_VALUE: 17
PIF_VALUE: 18
PIF_VALUE: 18
PIF_VALUE: 16
PIF_VALUE: 18
PIF_VALUE: 24
PIF_VALUE: 1
PIF_VALUE: 18
PIF_VALUE: 18
PIF_VALUE: 19
PIF_VALUE: 18
PIF_VALUE: 2
PIF_VALUE: 18
PIF_VALUE: 21
PIF_VALUE: 21
PIF_VALUE: 18
PIF_VALUE: 19
PIF_VALUE: 18
PIF_VALUE: 18
PIF_VALUE: 0
PIF_VALUE: 2
PIF_VALUE: 19
PIF_VALUE: 20
PIF_VALUE: 18
PIF_VALUE: 18
PIF_VALUE: 19
PIF_VALUE: 18
PIF_VALUE: 19
PIF_VALUE: 18
PIF_VALUE: 1
PIF_VALUE: 2
PIF_VALUE: 18
PIF_VALUE: 19
PIF_VALUE: 19
PIF_VALUE: 8
PIF_VALUE: 18
PIF_VALUE: 20
PIF_VALUE: 18
PIF_VALUE: 16
PIF_VALUE: 16
PIF_VALUE: 19
PIF_VALUE: 18
PIF_VALUE: 18
PIF_VALUE: 19
PIF_VALUE: 19
PIF_VALUE: 2
PIF_VALUE: 18
PIF_VALUE: 19
PIF_VALUE: 18
PIF_VALUE: 8
PIF_VALUE: 1
PIF_VALUE: 18
PIF_VALUE: 18
PIF_VALUE: 19
PIF_VALUE: 18
PIF_VALUE: 16
PIF_VALUE: 19
PIF_VALUE: 18
PIF_VALUE: 18
PIF_VALUE: 19
PIF_VALUE: 14
PIF_VALUE: 18
PIF_VALUE: 6
PIF_VALUE: 18
PIF_VALUE: 1

## 2019-07-22 ASSESSMENT — PAIN SCALES - GENERAL
PAINLEVEL_OUTOF10: 8

## 2019-07-22 ASSESSMENT — PAIN - FUNCTIONAL ASSESSMENT: PAIN_FUNCTIONAL_ASSESSMENT: 0-10

## 2019-07-22 NOTE — PROGRESS NOTES
1415 Report received from 79[facility]25 [facility]. Pt up to bathroom to void qs. Returned to room. Drink and ice cream given. Friend remains at side. 1430 Pt denies nausea or increase in pain level.  Discharge instructions given to pt and friend with each voicing understanding  1435 IV discontinued  1440 Discharge to home in stable ambulatory condition with friend

## 2019-07-22 NOTE — ANESTHESIA PRE PROCEDURE
Endo/Other:                     Abdominal:           Vascular:                                        Anesthesia Plan      general     ASA 2       Induction: intravenous. MIPS: Postoperative opioids intended and Prophylactic antiemetics administered. Anesthetic plan and risks discussed with patient. Plan discussed with CRNA. Carole Lal.  DO Robert   7/22/2019

## 2019-07-22 NOTE — PROGRESS NOTES
1113 Pt to PACU per cart. Pt arouses to name. Skin warm and dry. Resp easy. POx 95% on room air. Report from [address] and [address] [facility]. 1115 Pt opened mouth to assess throat with light - no bleeding noted in back of throat. Ice pack to anterior neck. 1123 Pt states \" throat pretty sore. \" Denies nausea. 1128 Fentanyl 50 MCG IV given for \" sore throat \" \"8\" on scale. 1130 Pt taking offered ice chips. 1134 POx 91% on room air - O2 applied at 4L/NC  1143 Medicated with Fentanyl 50 Mcg IV for continued \" sore throat - 8 \" Pt continues to take ice chips. 1149 Assessed back of throat with light - no bleeding noted. Pt took O2 off -\" nose itching like crazy. \"  1150 POx 95% on room air. 1155 Dr Dian Montes in to see pt - updated. 1210 Pt states pain \" still 6\" Explained to pt that would give oral pain med when in phase 2 recovery -- voice understanding. 1213 Pt to phase 2 recovery per cart. Pt awake and alert. Taking offered ice and popsicle. 1215 Girlfriend at bedside. 1228 Ready to medicate with Lortab and Atarax - pt c/o nausea. Explained to pt and girlfriend that I want to get the nausea under control before giving oral pain meds. 1232 Phenergan 12.5 Mg IM given for nausea. 1251 Pt states \" nausea a little better. \" Atarax 15 mg PO given for sore throat. 1252 Lortab 15 ml given as ordered for sore throat. Pt on cart. Girlfriend at bedside. Pt taking offered water. 1310 Pt resting - pt states nausea better and pain is unchanged. 80 Pt resting with eyes closed - pt awaked when this  RN was talking with girlfriend. Pt states pain \"4\". Denies needs at this time. 1410 Pt awake and requesting to use the bathroom. States pain remains \"4\" . To bathroom with staff assistance. Gait steady. Denies nausea. Requesting \" something to eat. \"  [phone] Report given to OSEI Malik RN.

## 2019-07-22 NOTE — ANESTHESIA POSTPROCEDURE EVALUATION
Department of Anesthesiology  Postprocedure Note    Patient: Yudith Carter  MRN: [id number]  YOB: 1990  Date of evaluation: 7/22/2019  Time:  11:29 AM     Procedure Summary     Date:  07/22/19 Room / Location:  [facility] OR 01 / [address]    Anesthesia Start:  1616 Anesthesia Stop:  1116    Procedure:  TONSILLECTOMY (N/A Mouth) Diagnosis:  (RECURRENT TONSILLITIS, TONSILLAR HYPERTROPHY, TONSIL STONE, HALITOSIS)    Surgeon:  Mando Mendieta MD Responsible Provider:  Jeffery Ragland DO    Anesthesia Type:  general ASA Status:  2          Anesthesia Type: general    Ivonne Phase I: Ivonne Score: 9    Ivonne Phase II:      Last vitals: Reviewed and per EMR flowsheets. Anesthesia Post Evaluation    Comments: [address]  POST-ANESTHESIA NOTE       Name:  Yudith Carter                                         Age:  34 y.o. MRN:  [id number]      Last Vitals:  /79   Pulse (!) 43   Temp 97.2 °F (36.2 °C) (Temporal)   Resp 10   Ht 6' 1\" (1.854 m)   Wt 226 lb 3.2 oz (102.6 kg)   SpO2 93%   BMI 29.84 kg/m²   Patient Vitals in the past 4 hrs:  07/22/19 1120, BP:123/79, Pulse:(!) 43, Resp:10, SpO2:93 %  07/22/19 1115, Pulse:50, Resp:17, SpO2:95 %  07/22/19 1113, BP:123/80, Temp:97.2 °F (36.2 °C), Temp src:Temporal, Pulse:(!) 48, Resp:20, SpO2:95 %  07/22/19 0828, BP:(!) 141/94, Temp:97.1 °F (36.2 °C), Temp src:Temporal, Pulse:56, Resp:16, SpO2:97 %, Height:6' 1\" (1.854 m), Weight:226 lb 3.2 oz (102.6 kg)    Level of Consciousness:  Awake    Respiratory:  Stable    Oxygen Saturation:  Stable    Cardiovascular:  Stable    Hydration:  Adequate    PONV:  Stable    Post-op Pain:  Adequate analgesia    Post-op Assessment:  No apparent anesthetic complications    Additional Follow-Up / Treatment / Comment:  None    Barbra Burleson.  DO Robert  July 22, 2019   11:29 AM

## 2019-07-23 NOTE — OP NOTE
[address] [id number]                                OPERATIVE REPORT    PATIENT NAME: Elle STAFFORD                     :        1990  MED REC NO:   [id number]                           ROOM:  ACCOUNT NO:   [de-identified]                           ADMIT DATE: 2019  PROVIDER:     Jadyn King. FREDDIE Pierreks:  2019    OPERATION:  Tonsillectomy, greater than age 15. SURGEON:  Jadyn King. Ger Narayan MD    ANESTHESIA:  General endotracheal.    PREOPERATIVE DIAGNOSES:  Tonsil hypertrophy, recurrent chronic  tonsillitis with tonsil stones, halitosis. POSTOPERATIVE DIAGNOSES:  Tonsil hypertrophy, recurrent chronic  tonsillitis with tonsil stones, halitosis. HISTORY AND OPERATIVE FINDINGS:  This 51-year-old male has had large  tonsils for a protracted period of time despite vigorous medical  therapy. It was debrided and the tonsils had some very large  tonsilliths extruded at surgery. There was some brisk bleeding from the left tonsil at the junction of  the middle and lower thirds. Two different 4-0 chromic sutures on the  RB-1 needle were placed to control that. OPERATIVE PROCEDURE:  After an adequate level of general endotracheal  anesthesia had been obtained, Devin mouth gag was placed and adenoid pad  was inspected and noted to be extremely small and/or had previously  removed. Tonsils removed with dissection and low-power cautery. Hemostasis was  supplemented with suction cautery, pack, 4-0 chromic suture ligatures,  and topical silver nitrate. When the tonsils had been removed and the  bleeding had been controlled, 0.5% ropivacaine was injected into both  tonsillar fossae for postoperative pain relief. Stomach was suctioned  of small amount of clear fluid. Hemostasis was reinspected. Mouth gag was released, re-engaged.   Tonsil  beds were swiped and there being no further bleeding,

## 2019-07-30 ENCOUNTER — TELEPHONE (OUTPATIENT)
Dept: ENT CLINIC | Age: 29
End: 2019-07-30

## 2019-07-30 NOTE — TELEPHONE ENCOUNTER
We received a letter from [address] [place] [address] in regards to no being able to contact patient.  (letter scanned into patient chart.)

## 2019-08-08 ENCOUNTER — OFFICE VISIT (OUTPATIENT)
Dept: ALLERGY | Age: 29
End: 2019-08-08
Payer: MEDICARE

## 2019-08-08 ENCOUNTER — TELEPHONE (OUTPATIENT)
Dept: ENT CLINIC | Age: 29
End: 2019-08-08

## 2019-08-08 VITALS
DIASTOLIC BLOOD PRESSURE: 80 MMHG | RESPIRATION RATE: 16 BRPM | BODY MASS INDEX: 29.21 KG/M2 | TEMPERATURE: 98.9 F | SYSTOLIC BLOOD PRESSURE: 124 MMHG | HEIGHT: 73 IN | HEART RATE: 64 BPM | WEIGHT: 220.4 LBS

## 2019-08-08 DIAGNOSIS — J30.1 ALLERGY TO TREES: ICD-10-CM

## 2019-08-08 DIAGNOSIS — Z91.09 POLLEN ALLERGIES: ICD-10-CM

## 2019-08-08 DIAGNOSIS — H10.10 SEASONAL AND PERENNIAL ALLERGIC RHINOCONJUNCTIVITIS: ICD-10-CM

## 2019-08-08 DIAGNOSIS — J30.89 SEASONAL AND PERENNIAL ALLERGIC RHINOCONJUNCTIVITIS: ICD-10-CM

## 2019-08-08 DIAGNOSIS — J30.2 SEASONAL AND PERENNIAL ALLERGIC RHINOCONJUNCTIVITIS: ICD-10-CM

## 2019-08-08 DIAGNOSIS — Z91.038 ALLERGY TO COCKROACHES: Primary | ICD-10-CM

## 2019-08-08 DIAGNOSIS — J30.9 ALLERGIC RHINOCONJUNCTIVITIS: ICD-10-CM

## 2019-08-08 DIAGNOSIS — J30.81 ALLERGY TO DOG DANDER: ICD-10-CM

## 2019-08-08 DIAGNOSIS — H10.10 ALLERGIC RHINOCONJUNCTIVITIS: ICD-10-CM

## 2019-08-08 DIAGNOSIS — T63.91XA TOXIC EFFECT OF VENOM, ACCIDENTAL OR UNINTENTIONAL, INITIAL ENCOUNTER: ICD-10-CM

## 2019-08-08 PROCEDURE — G8427 DOCREV CUR MEDS BY ELIG CLIN: HCPCS | Performed by: NURSE PRACTITIONER

## 2019-08-08 PROCEDURE — G8419 CALC BMI OUT NRM PARAM NOF/U: HCPCS | Performed by: NURSE PRACTITIONER

## 2019-08-08 PROCEDURE — 1036F TOBACCO NON-USER: CPT | Performed by: NURSE PRACTITIONER

## 2019-08-08 PROCEDURE — 99213 OFFICE O/P EST LOW 20 MIN: CPT | Performed by: NURSE PRACTITIONER

## 2019-08-08 RX ORDER — MONTELUKAST SODIUM 10 MG/1
10 TABLET ORAL NIGHTLY
Qty: 30 TABLET | Refills: 5 | Status: SHIPPED | OUTPATIENT
Start: 2019-08-08 | End: 2021-07-06

## 2019-08-08 RX ORDER — EPINEPHRINE 0.3 MG/.3ML
INJECTION SUBCUTANEOUS
Qty: 4 EACH | Refills: 2 | Status: SHIPPED | OUTPATIENT
Start: 2019-08-08 | End: 2021-07-06

## 2019-08-08 ASSESSMENT — ENCOUNTER SYMPTOMS: SORE THROAT: 1

## 2019-08-08 NOTE — PATIENT INSTRUCTIONS
Allergy Avoidance Measures  1. Shower and wash your hair before going to bed during pollen season. 2. Don't dry clothes and bedding outside where pollen and molds will stick to them. 3. Wash your hands after petting an animal.  4. Run a night light continuously in dark closets and basements to help reduce molds. 5. Place stuffed animal in the freezer for a day to kill dust mites. 6. Be aware that during the mid morning and early evening, pollen levels are the highest of the day. 7. Undress outside your bedroom, leaving allergens from other places away from where you sleep. 8. Remove and wash your clothing immediately after visiting friends with pets. 9. Seal your pillows and mattresses in allergy-proof coverings so dust mites can't get to your nose and eyes. 10. Use a dehumidifier to reduce molds, especially in damp, humid places like                 basements, maintaining a humidity level between 30%-50%. 11. Remove carpeting in your home if allergic to pets. Hardwood, tile, and linoleum are easier to keep dander free. 12. If you are allergic to bees, wasps, or yellow jackets, avoid bright colored clothing, scented hairspray, deodorant or perfume, and avoid picnics and BBQ'S. 13. Dust mites and dander will accumulate in upholstered furniture. 14. To thoroughly clean, dust with a damp rag or mop rather than dry dusting/sweeping. 15. Use a vacuum  with a HEPA filter and clean/change the filter when needed. 16. Wear a dust mask while vacuuming to avoid inhaling stirred-up-dust.  17. Leave a room that was just dusted or vacuumed for at least 20 minutes to allow        airborne dust to settle. 18. If you live with a pet, cover the air ducts to your bedroom if you have forced-air heating/cooling. 19. Thoroughly clean moldy areas with a 1 to 10 part diluted mixture of chlorine bleach   and water. 21. Do not allow smoking in your home.   21. Have a non-allergic person clean the cages of small uses high wash temperatures.)  38. Before taking a lengthy auto trip, have your car's air conditioner unit thoroughly           cleaned of mildew and mold. 39. When exercising outdoors, only do so on wind-free days. 40. Install and run a vent fan in the bathroom while showering or bathing. 41. Avoid irritants like tobacco smoke, aerosols, perfumes, and cleaning products  with strong odors. 43. Leave your home while it is being painted and be sure to use latex rather than       oil-based paints. 43. Before relocating try to visit the new location for 2-4 weeks to see if your symptoms   improve. Remember that it may take months or years to develop allergic symptoms       to a new allergen. 44. Replace down, feather, and foam pillow with fiberfill products. 39. Prune trees and brushes regularly to avoid heavy vegetation around the house. 55. Keep your pets out of the bedroom so you are not exposed to animal allergens while you sleep. 52. Talk to your provider about your symptoms, because you don't have to suffer. Your provider can prescribe effective treatments for controlling allergic symptoms when you  can't escape the allergens that surround you.

## 2019-08-08 NOTE — PROGRESS NOTES
Comment: once weekends        Allergies:  Bee venom; Morphine; and Seasonal    CurrentMedications:     Current Outpatient Medications:     EPINEPHrine (AUVI-Q) 0.3 MG/0.3ML SOAJ injection, Dispense 2 packs of 2 (total 4 devices). Use as directed, STAT for allergic reaction. , Disp: 4 each, Rfl: 2    montelukast (SINGULAIR) 10 MG tablet, Take 1 tablet by mouth nightly, Disp: 30 tablet, Rfl: 5    levocetirizine (XYZAL ALLERGY 24HR) 5 MG tablet, Take 1 tablet by mouth nightly, Disp: 30 tablet, Rfl: 11    Multiple Vitamins-Minerals (MULTIVITAMIN PO), Take by mouth daily, Disp: , Rfl:       Physical Exam:    Vitals:    Temp: 98.9 °F (37.2 °C) I @FLOWSTAT(6)@ IPulse: 64 I @FLOWSTAT(8)@ I BP: 124/80 I @BENYFT(33)@; @VOPVEE(56)@ I Resp: 16 I @FLOWSTAT(9)@ I   I @FLOWSTAT(10)@ I   I Height: 6' 1\" (185.4 cm) I   I Facility age limit for growth percentiles is 20 years. I     Facility age limit for growth percentiles is 20 years. Facility age limit for growth percentiles is 20 years. Facility age limit for growth percentiles is 20 years. Facility age limit for growth percentiles is 20 years. Physical Exam:    Physical Exam   Constitutional: He is oriented to person, place, and time. He appears well-developed and well-nourished. HENT:   Head: Normocephalic and atraumatic. Right Ear: External ear normal.   Left Ear: External ear normal.   Nose: Nose normal.   Throat slightly irritated and red with few scabs noted at sides of tonsils from previous tonsillectomy over the last few weeks   Eyes: Pupils are equal, round, and reactive to light. Conjunctivae and EOM are normal.   Neck: Normal range of motion. Neck supple. Cardiovascular: Normal rate, regular rhythm and normal heart sounds. Pulmonary/Chest: Effort normal and breath sounds normal.   Musculoskeletal: Normal range of motion. Neurological: He is alert and oriented to person, place, and time. Skin: Skin is warm and dry.    Psychiatric: He has a normal mood

## 2021-05-23 ENCOUNTER — APPOINTMENT (OUTPATIENT)
Dept: GENERAL RADIOLOGY | Age: 31
DRG: 364 | End: 2021-05-23
Payer: MEDICAID

## 2021-05-23 ENCOUNTER — APPOINTMENT (OUTPATIENT)
Dept: CT IMAGING | Age: 31
DRG: 364 | End: 2021-05-23
Payer: MEDICAID

## 2021-05-23 ENCOUNTER — ANESTHESIA (OUTPATIENT)
Dept: OPERATING ROOM | Age: 31
DRG: 364 | End: 2021-05-23
Payer: MEDICAID

## 2021-05-23 ENCOUNTER — HOSPITAL ENCOUNTER (INPATIENT)
Age: 31
LOS: 1 days | Discharge: HOME | DRG: 364 | End: 2021-05-24
Attending: EMERGENCY MEDICINE | Admitting: SURGERY
Payer: MEDICAID

## 2021-05-23 ENCOUNTER — ANESTHESIA EVENT (OUTPATIENT)
Dept: OPERATING ROOM | Age: 31
DRG: 364 | End: 2021-05-23
Payer: MEDICAID

## 2021-05-23 VITALS
RESPIRATION RATE: 11 BRPM | TEMPERATURE: 96.6 F | DIASTOLIC BLOOD PRESSURE: 85 MMHG | SYSTOLIC BLOOD PRESSURE: 114 MMHG | OXYGEN SATURATION: 100 %

## 2021-05-23 DIAGNOSIS — S41.112A LACERATION OF LEFT UPPER EXTREMITY, INITIAL ENCOUNTER: Primary | ICD-10-CM

## 2021-05-23 DIAGNOSIS — S01.81XA FACIAL LACERATION, INITIAL ENCOUNTER: ICD-10-CM

## 2021-05-23 DIAGNOSIS — T14.90XA TRAUMA: ICD-10-CM

## 2021-05-23 LAB
ABO: NORMAL
ALBUMIN SERPL-MCNC: 4.1 G/DL (ref 3.5–5.1)
ALP BLD-CCNC: 40 U/L (ref 38–126)
ALT SERPL-CCNC: 16 U/L (ref 11–66)
ANION GAP SERPL CALCULATED.3IONS-SCNC: 12 MEQ/L (ref 8–16)
ANION GAP SERPL CALCULATED.3IONS-SCNC: 15 MEQ/L (ref 8–16)
ANTIBODY SCREEN: NORMAL
AST SERPL-CCNC: 21 U/L (ref 5–40)
BASOPHILS # BLD: 0.7 %
BASOPHILS ABSOLUTE: 0 THOU/MM3 (ref 0–0.1)
BILIRUB SERPL-MCNC: 0.3 MG/DL (ref 0.3–1.2)
BUN BLDV-MCNC: 12 MG/DL (ref 7–22)
BUN BLDV-MCNC: 15 MG/DL (ref 7–22)
CALCIUM SERPL-MCNC: 8 MG/DL (ref 8.5–10.5)
CALCIUM SERPL-MCNC: 8.4 MG/DL (ref 8.5–10.5)
CHLORIDE BLD-SCNC: 102 MEQ/L (ref 98–111)
CHLORIDE BLD-SCNC: 104 MEQ/L (ref 98–111)
CO2: 20 MEQ/L (ref 23–33)
CO2: 22 MEQ/L (ref 23–33)
CREAT SERPL-MCNC: 0.8 MG/DL (ref 0.4–1.2)
CREAT SERPL-MCNC: 1 MG/DL (ref 0.4–1.2)
EOSINOPHIL # BLD: 1 %
EOSINOPHILS ABSOLUTE: 0.1 THOU/MM3 (ref 0–0.4)
ERYTHROCYTE [DISTWIDTH] IN BLOOD BY AUTOMATED COUNT: 11.8 % (ref 11.5–14.5)
ERYTHROCYTE [DISTWIDTH] IN BLOOD BY AUTOMATED COUNT: 11.8 % (ref 11.5–14.5)
ERYTHROCYTE [DISTWIDTH] IN BLOOD BY AUTOMATED COUNT: 39.8 FL (ref 35–45)
ERYTHROCYTE [DISTWIDTH] IN BLOOD BY AUTOMATED COUNT: 40 FL (ref 35–45)
ETHYL ALCOHOL, SERUM: 0.11 %
GFR SERPL CREATININE-BSD FRML MDRD: 88 ML/MIN/1.73M2
GFR SERPL CREATININE-BSD FRML MDRD: > 90 ML/MIN/1.73M2
GLUCOSE BLD-MCNC: 104 MG/DL (ref 70–108)
GLUCOSE BLD-MCNC: 94 MG/DL (ref 70–108)
HCT VFR BLD CALC: 37 % (ref 42–52)
HCT VFR BLD CALC: 37.8 % (ref 42–52)
HEMOGLOBIN: 12.4 GM/DL (ref 14–18)
HEMOGLOBIN: 12.6 GM/DL (ref 14–18)
IMMATURE GRANS (ABS): 0.03 THOU/MM3 (ref 0–0.07)
IMMATURE GRANULOCYTES: 0.4 %
INR BLD: 1.05 (ref 0.85–1.13)
LACTIC ACID: 5 MMOL/L (ref 0.5–2)
LYMPHOCYTES # BLD: 25.8 %
LYMPHOCYTES ABSOLUTE: 1.8 THOU/MM3 (ref 1–4.8)
MCH RBC QN AUTO: 30.6 PG (ref 26–33)
MCH RBC QN AUTO: 30.8 PG (ref 26–33)
MCHC RBC AUTO-ENTMCNC: 33.3 GM/DL (ref 32.2–35.5)
MCHC RBC AUTO-ENTMCNC: 33.5 GM/DL (ref 32.2–35.5)
MCV RBC AUTO: 91.4 FL (ref 80–94)
MCV RBC AUTO: 92.4 FL (ref 80–94)
MONOCYTES # BLD: 6.9 %
MONOCYTES ABSOLUTE: 0.5 THOU/MM3 (ref 0.4–1.3)
NUCLEATED RED BLOOD CELLS: 0 /100 WBC
OSMOLALITY CALCULATION: 275 MOSMOL/KG (ref 275–300)
PLATELET # BLD: 193 THOU/MM3 (ref 130–400)
PLATELET # BLD: 203 THOU/MM3 (ref 130–400)
PMV BLD AUTO: 10.3 FL (ref 9.4–12.4)
PMV BLD AUTO: 10.4 FL (ref 9.4–12.4)
POTASSIUM REFLEX MAGNESIUM: 4.2 MEQ/L (ref 3.5–5.2)
POTASSIUM SERPL-SCNC: 3.4 MEQ/L (ref 3.5–5.2)
RBC # BLD: 4.05 MILL/MM3 (ref 4.7–6.1)
RBC # BLD: 4.09 MILL/MM3 (ref 4.7–6.1)
RH FACTOR: NORMAL
SEG NEUTROPHILS: 65.2 %
SEGMENTED NEUTROPHILS ABSOLUTE COUNT: 4.4 THOU/MM3 (ref 1.8–7.7)
SODIUM BLD-SCNC: 137 MEQ/L (ref 135–145)
SODIUM BLD-SCNC: 138 MEQ/L (ref 135–145)
TOTAL PROTEIN: 6.8 G/DL (ref 6.1–8)
WBC # BLD: 10.7 THOU/MM3 (ref 4.8–10.8)
WBC # BLD: 6.8 THOU/MM3 (ref 4.8–10.8)

## 2021-05-23 PROCEDURE — 6370000000 HC RX 637 (ALT 250 FOR IP): Performed by: SURGERY

## 2021-05-23 PROCEDURE — 2580000003 HC RX 258: Performed by: ORTHOPAEDIC SURGERY

## 2021-05-23 PROCEDURE — 13131 CMPLX RPR F/C/C/M/N/AX/G/H/F: CPT | Performed by: SURGERY

## 2021-05-23 PROCEDURE — 96365 THER/PROPH/DIAG IV INF INIT: CPT

## 2021-05-23 PROCEDURE — 13122 CMPLX RPR S/A/L ADDL 5 CM/>: CPT | Performed by: SURGERY

## 2021-05-23 PROCEDURE — 73130 X-RAY EXAM OF HAND: CPT

## 2021-05-23 PROCEDURE — 1200000000 HC SEMI PRIVATE

## 2021-05-23 PROCEDURE — 6360000002 HC RX W HCPCS: Performed by: ORTHOPAEDIC SURGERY

## 2021-05-23 PROCEDURE — 96376 TX/PRO/DX INJ SAME DRUG ADON: CPT

## 2021-05-23 PROCEDURE — 3700000000 HC ANESTHESIA ATTENDED CARE: Performed by: ORTHOPAEDIC SURGERY

## 2021-05-23 PROCEDURE — 6360000004 HC RX CONTRAST MEDICATION: Performed by: EMERGENCY MEDICINE

## 2021-05-23 PROCEDURE — 2709999900 HC NON-CHARGEABLE SUPPLY: Performed by: ORTHOPAEDIC SURGERY

## 2021-05-23 PROCEDURE — 99223 1ST HOSP IP/OBS HIGH 75: CPT | Performed by: SURGERY

## 2021-05-23 PROCEDURE — 85027 COMPLETE CBC AUTOMATED: CPT

## 2021-05-23 PROCEDURE — 83605 ASSAY OF LACTIC ACID: CPT

## 2021-05-23 PROCEDURE — 20103 EXPL PENTRG WOUND EXTREMITY: CPT | Performed by: SURGERY

## 2021-05-23 PROCEDURE — 85610 PROTHROMBIN TIME: CPT

## 2021-05-23 PROCEDURE — 90471 IMMUNIZATION ADMIN: CPT | Performed by: PHYSICIAN ASSISTANT

## 2021-05-23 PROCEDURE — 6360000002 HC RX W HCPCS: Performed by: ANESTHESIOLOGY

## 2021-05-23 PROCEDURE — 86850 RBC ANTIBODY SCREEN: CPT

## 2021-05-23 PROCEDURE — 6360000002 HC RX W HCPCS

## 2021-05-23 PROCEDURE — 96374 THER/PROPH/DIAG INJ IV PUSH: CPT

## 2021-05-23 PROCEDURE — 36415 COLL VENOUS BLD VENIPUNCTURE: CPT

## 2021-05-23 PROCEDURE — 0HQ1XZZ REPAIR FACE SKIN, EXTERNAL APPROACH: ICD-10-PCS | Performed by: ORTHOPAEDIC SURGERY

## 2021-05-23 PROCEDURE — 0JBF0ZZ EXCISION OF LEFT UPPER ARM SUBCUTANEOUS TISSUE AND FASCIA, OPEN APPROACH: ICD-10-PCS | Performed by: ORTHOPAEDIC SURGERY

## 2021-05-23 PROCEDURE — 6820000003 HC L2 TRAUMA ALERT ACTIVATION: Performed by: SURGERY

## 2021-05-23 PROCEDURE — 80053 COMPREHEN METABOLIC PANEL: CPT

## 2021-05-23 PROCEDURE — 86900 BLOOD TYPING SEROLOGIC ABO: CPT

## 2021-05-23 PROCEDURE — 6360000002 HC RX W HCPCS: Performed by: PHYSICIAN ASSISTANT

## 2021-05-23 PROCEDURE — 73206 CT ANGIO UPR EXTRM W/O&W/DYE: CPT

## 2021-05-23 PROCEDURE — 3600000012 HC SURGERY LEVEL 2 ADDTL 15MIN: Performed by: ORTHOPAEDIC SURGERY

## 2021-05-23 PROCEDURE — 82077 ASSAY SPEC XCP UR&BREATH IA: CPT

## 2021-05-23 PROCEDURE — 86901 BLOOD TYPING SEROLOGIC RH(D): CPT

## 2021-05-23 PROCEDURE — 2580000003 HC RX 258: Performed by: PHYSICIAN ASSISTANT

## 2021-05-23 PROCEDURE — 3700000001 HC ADD 15 MINUTES (ANESTHESIA): Performed by: ORTHOPAEDIC SURGERY

## 2021-05-23 PROCEDURE — 13121 CMPLX RPR S/A/L 2.6-7.5 CM: CPT | Performed by: SURGERY

## 2021-05-23 PROCEDURE — 2500000003 HC RX 250 WO HCPCS: Performed by: ANESTHESIOLOGY

## 2021-05-23 PROCEDURE — 90715 TDAP VACCINE 7 YRS/> IM: CPT | Performed by: PHYSICIAN ASSISTANT

## 2021-05-23 PROCEDURE — 7100000000 HC PACU RECOVERY - FIRST 15 MIN: Performed by: ORTHOPAEDIC SURGERY

## 2021-05-23 PROCEDURE — 85025 COMPLETE CBC W/AUTO DIFF WBC: CPT

## 2021-05-23 PROCEDURE — 0KQ80ZZ REPAIR LEFT UPPER ARM MUSCLE, OPEN APPROACH: ICD-10-PCS | Performed by: ORTHOPAEDIC SURGERY

## 2021-05-23 PROCEDURE — 7100000001 HC PACU RECOVERY - ADDTL 15 MIN: Performed by: ORTHOPAEDIC SURGERY

## 2021-05-23 PROCEDURE — 99285 EMERGENCY DEPT VISIT HI MDM: CPT

## 2021-05-23 PROCEDURE — 6370000000 HC RX 637 (ALT 250 FOR IP): Performed by: PHYSICIAN ASSISTANT

## 2021-05-23 PROCEDURE — 3600000002 HC SURGERY LEVEL 2 BASE: Performed by: ORTHOPAEDIC SURGERY

## 2021-05-23 PROCEDURE — 2500000003 HC RX 250 WO HCPCS: Performed by: PHYSICIAN ASSISTANT

## 2021-05-23 PROCEDURE — 6360000002 HC RX W HCPCS: Performed by: EMERGENCY MEDICINE

## 2021-05-23 RX ORDER — FENTANYL CITRATE 50 UG/ML
25 INJECTION, SOLUTION INTRAMUSCULAR; INTRAVENOUS EVERY 5 MIN PRN
Status: DISCONTINUED | OUTPATIENT
Start: 2021-05-23 | End: 2021-05-23 | Stop reason: HOSPADM

## 2021-05-23 RX ORDER — CEFAZOLIN SODIUM 1 G/3ML
INJECTION, POWDER, FOR SOLUTION INTRAMUSCULAR; INTRAVENOUS PRN
Status: DISCONTINUED | OUTPATIENT
Start: 2021-05-23 | End: 2021-05-23 | Stop reason: SDUPTHER

## 2021-05-23 RX ORDER — FENTANYL CITRATE 50 UG/ML
50 INJECTION, SOLUTION INTRAMUSCULAR; INTRAVENOUS
Status: DISCONTINUED | OUTPATIENT
Start: 2021-05-23 | End: 2021-05-24 | Stop reason: HOSPADM

## 2021-05-23 RX ORDER — FENTANYL CITRATE 50 UG/ML
50 INJECTION, SOLUTION INTRAMUSCULAR; INTRAVENOUS EVERY 5 MIN PRN
Status: DISCONTINUED | OUTPATIENT
Start: 2021-05-23 | End: 2021-05-23 | Stop reason: HOSPADM

## 2021-05-23 RX ORDER — HYDROCODONE BITARTRATE AND ACETAMINOPHEN 5; 325 MG/1; MG/1
1 TABLET ORAL EVERY 4 HOURS PRN
Status: DISCONTINUED | OUTPATIENT
Start: 2021-05-23 | End: 2021-05-24 | Stop reason: HOSPADM

## 2021-05-23 RX ORDER — ONDANSETRON 2 MG/ML
INJECTION INTRAMUSCULAR; INTRAVENOUS
Status: COMPLETED
Start: 2021-05-23 | End: 2021-05-23

## 2021-05-23 RX ORDER — PROMETHAZINE HYDROCHLORIDE 25 MG/1
12.5 TABLET ORAL EVERY 6 HOURS PRN
Status: DISCONTINUED | OUTPATIENT
Start: 2021-05-23 | End: 2021-05-24 | Stop reason: HOSPADM

## 2021-05-23 RX ORDER — DEXAMETHASONE SODIUM PHOSPHATE 10 MG/ML
INJECTION, EMULSION INTRAMUSCULAR; INTRAVENOUS PRN
Status: DISCONTINUED | OUTPATIENT
Start: 2021-05-23 | End: 2021-05-23 | Stop reason: SDUPTHER

## 2021-05-23 RX ORDER — ACETAMINOPHEN 325 MG/1
650 TABLET ORAL EVERY 4 HOURS PRN
Status: DISCONTINUED | OUTPATIENT
Start: 2021-05-23 | End: 2021-05-24 | Stop reason: HOSPADM

## 2021-05-23 RX ORDER — LIDOCAINE HCL/PF 100 MG/5ML
SYRINGE (ML) INJECTION PRN
Status: DISCONTINUED | OUTPATIENT
Start: 2021-05-23 | End: 2021-05-23 | Stop reason: SDUPTHER

## 2021-05-23 RX ORDER — GINSENG 100 MG
CAPSULE ORAL PRN
Status: DISCONTINUED | OUTPATIENT
Start: 2021-05-23 | End: 2021-05-23 | Stop reason: ALTCHOICE

## 2021-05-23 RX ORDER — FENTANYL CITRATE 50 UG/ML
INJECTION, SOLUTION INTRAMUSCULAR; INTRAVENOUS PRN
Status: DISCONTINUED | OUTPATIENT
Start: 2021-05-23 | End: 2021-05-23 | Stop reason: SDUPTHER

## 2021-05-23 RX ORDER — FENTANYL CITRATE 50 UG/ML
50 INJECTION, SOLUTION INTRAMUSCULAR; INTRAVENOUS ONCE
Status: COMPLETED | OUTPATIENT
Start: 2021-05-23 | End: 2021-05-23

## 2021-05-23 RX ORDER — FENTANYL CITRATE 50 UG/ML
INJECTION, SOLUTION INTRAMUSCULAR; INTRAVENOUS
Status: DISPENSED
Start: 2021-05-23 | End: 2021-05-23

## 2021-05-23 RX ORDER — SODIUM CHLORIDE 0.9 % (FLUSH) 0.9 %
5-40 SYRINGE (ML) INJECTION PRN
Status: DISCONTINUED | OUTPATIENT
Start: 2021-05-23 | End: 2021-05-24 | Stop reason: HOSPADM

## 2021-05-23 RX ORDER — MIDAZOLAM HYDROCHLORIDE 1 MG/ML
INJECTION INTRAMUSCULAR; INTRAVENOUS PRN
Status: DISCONTINUED | OUTPATIENT
Start: 2021-05-23 | End: 2021-05-23 | Stop reason: SDUPTHER

## 2021-05-23 RX ORDER — SODIUM CHLORIDE 9 MG/ML
25 INJECTION, SOLUTION INTRAVENOUS PRN
Status: DISCONTINUED | OUTPATIENT
Start: 2021-05-23 | End: 2021-05-24 | Stop reason: HOSPADM

## 2021-05-23 RX ORDER — POLYETHYLENE GLYCOL 3350 17 G/17G
17 POWDER, FOR SOLUTION ORAL DAILY PRN
Status: DISCONTINUED | OUTPATIENT
Start: 2021-05-23 | End: 2021-05-24 | Stop reason: HOSPADM

## 2021-05-23 RX ORDER — PROPOFOL 10 MG/ML
INJECTION, EMULSION INTRAVENOUS PRN
Status: DISCONTINUED | OUTPATIENT
Start: 2021-05-23 | End: 2021-05-23 | Stop reason: SDUPTHER

## 2021-05-23 RX ORDER — FENTANYL CITRATE 50 UG/ML
25 INJECTION, SOLUTION INTRAMUSCULAR; INTRAVENOUS
Status: DISCONTINUED | OUTPATIENT
Start: 2021-05-23 | End: 2021-05-24 | Stop reason: HOSPADM

## 2021-05-23 RX ORDER — MORPHINE SULFATE 4 MG/ML
INJECTION, SOLUTION INTRAMUSCULAR; INTRAVENOUS
Status: DISPENSED
Start: 2021-05-23 | End: 2021-05-23

## 2021-05-23 RX ORDER — SODIUM CHLORIDE 9 MG/ML
INJECTION, SOLUTION INTRAVENOUS CONTINUOUS
Status: DISCONTINUED | OUTPATIENT
Start: 2021-05-23 | End: 2021-05-24 | Stop reason: HOSPADM

## 2021-05-23 RX ORDER — HYDROCODONE BITARTRATE AND ACETAMINOPHEN 5; 325 MG/1; MG/1
2 TABLET ORAL EVERY 4 HOURS PRN
Status: DISCONTINUED | OUTPATIENT
Start: 2021-05-23 | End: 2021-05-24 | Stop reason: HOSPADM

## 2021-05-23 RX ORDER — LABETALOL 20 MG/4 ML (5 MG/ML) INTRAVENOUS SYRINGE
10 EVERY 10 MIN PRN
Status: DISCONTINUED | OUTPATIENT
Start: 2021-05-23 | End: 2021-05-23 | Stop reason: HOSPADM

## 2021-05-23 RX ORDER — FENTANYL CITRATE 50 UG/ML
INJECTION, SOLUTION INTRAMUSCULAR; INTRAVENOUS DAILY PRN
Status: COMPLETED | OUTPATIENT
Start: 2021-05-23 | End: 2021-05-23

## 2021-05-23 RX ORDER — ONDANSETRON 2 MG/ML
4 INJECTION INTRAMUSCULAR; INTRAVENOUS EVERY 6 HOURS PRN
Status: DISCONTINUED | OUTPATIENT
Start: 2021-05-23 | End: 2021-05-24 | Stop reason: HOSPADM

## 2021-05-23 RX ORDER — ONDANSETRON 2 MG/ML
INJECTION INTRAMUSCULAR; INTRAVENOUS PRN
Status: DISCONTINUED | OUTPATIENT
Start: 2021-05-23 | End: 2021-05-23 | Stop reason: SDUPTHER

## 2021-05-23 RX ORDER — SUCCINYLCHOLINE/SOD CL,ISO/PF 200MG/10ML
SYRINGE (ML) INTRAVENOUS PRN
Status: DISCONTINUED | OUTPATIENT
Start: 2021-05-23 | End: 2021-05-23 | Stop reason: SDUPTHER

## 2021-05-23 RX ORDER — SODIUM CHLORIDE 0.9 % (FLUSH) 0.9 %
5-40 SYRINGE (ML) INJECTION EVERY 12 HOURS SCHEDULED
Status: DISCONTINUED | OUTPATIENT
Start: 2021-05-23 | End: 2021-05-24 | Stop reason: HOSPADM

## 2021-05-23 RX ORDER — ONDANSETRON 2 MG/ML
INJECTION INTRAMUSCULAR; INTRAVENOUS
Status: DISPENSED
Start: 2021-05-23 | End: 2021-05-23

## 2021-05-23 RX ADMIN — PROPOFOL 180 MG: 10 INJECTION, EMULSION INTRAVENOUS at 10:50

## 2021-05-23 RX ADMIN — FENTANYL CITRATE 50 MCG: 50 INJECTION INTRAMUSCULAR; INTRAVENOUS at 13:32

## 2021-05-23 RX ADMIN — Medication 160 MG: at 10:51

## 2021-05-23 RX ADMIN — CEFAZOLIN 2000 MG: 1 INJECTION, POWDER, FOR SOLUTION INTRAMUSCULAR; INTRAVENOUS at 11:01

## 2021-05-23 RX ADMIN — SODIUM CHLORIDE: 9 INJECTION, SOLUTION INTRAVENOUS at 17:30

## 2021-05-23 RX ADMIN — HYDROCODONE BITARTRATE AND ACETAMINOPHEN 2 TABLET: 5; 325 TABLET ORAL at 19:53

## 2021-05-23 RX ADMIN — MIDAZOLAM 2 MG: 1 INJECTION INTRAMUSCULAR; INTRAVENOUS at 10:50

## 2021-05-23 RX ADMIN — FENTANYL CITRATE 50 MCG: 50 INJECTION, SOLUTION INTRAMUSCULAR; INTRAVENOUS at 12:35

## 2021-05-23 RX ADMIN — CEFAZOLIN SODIUM 2000 MG: 10 INJECTION, POWDER, FOR SOLUTION INTRAVENOUS at 03:57

## 2021-05-23 RX ADMIN — SODIUM CHLORIDE: 9 INJECTION, SOLUTION INTRAVENOUS at 11:05

## 2021-05-23 RX ADMIN — DEXAMETHASONE SODIUM PHOSPHATE 10 MG: 10 INJECTION, EMULSION INTRAMUSCULAR; INTRAVENOUS at 11:06

## 2021-05-23 RX ADMIN — FENTANYL CITRATE 50 MCG: 50 INJECTION, SOLUTION INTRAMUSCULAR; INTRAVENOUS at 12:30

## 2021-05-23 RX ADMIN — IOPAMIDOL 80 ML: 755 INJECTION, SOLUTION INTRAVENOUS at 03:36

## 2021-05-23 RX ADMIN — CEFAZOLIN 2000 MG: 10 INJECTION, POWDER, FOR SOLUTION INTRAVENOUS at 14:19

## 2021-05-23 RX ADMIN — SODIUM CHLORIDE: 9 INJECTION, SOLUTION INTRAVENOUS at 06:01

## 2021-05-23 RX ADMIN — FAMOTIDINE 20 MG: 10 INJECTION, SOLUTION INTRAVENOUS at 08:13

## 2021-05-23 RX ADMIN — PROPOFOL 20 MG: 10 INJECTION, EMULSION INTRAVENOUS at 11:02

## 2021-05-23 RX ADMIN — HYDROMORPHONE HYDROCHLORIDE 0.5 MG: 1 INJECTION, SOLUTION INTRAMUSCULAR; INTRAVENOUS; SUBCUTANEOUS at 05:14

## 2021-05-23 RX ADMIN — SODIUM CHLORIDE, PRESERVATIVE FREE 10 ML: 5 INJECTION INTRAVENOUS at 08:13

## 2021-05-23 RX ADMIN — FENTANYL CITRATE 50 MCG: 50 INJECTION INTRAMUSCULAR; INTRAVENOUS at 08:13

## 2021-05-23 RX ADMIN — CEFAZOLIN 2000 MG: 10 INJECTION, POWDER, FOR SOLUTION INTRAVENOUS at 21:43

## 2021-05-23 RX ADMIN — FENTANYL CITRATE 25 MCG: 50 INJECTION, SOLUTION INTRAMUSCULAR; INTRAVENOUS at 02:53

## 2021-05-23 RX ADMIN — FENTANYL CITRATE 50 MCG: 50 INJECTION, SOLUTION INTRAMUSCULAR; INTRAVENOUS at 03:57

## 2021-05-23 RX ADMIN — TETANUS TOXOID, REDUCED DIPHTHERIA TOXOID AND ACELLULAR PERTUSSIS VACCINE, ADSORBED 0.5 ML: 5; 2.5; 8; 8; 2.5 SUSPENSION INTRAMUSCULAR at 04:32

## 2021-05-23 RX ADMIN — FENTANYL CITRATE 100 MCG: 50 INJECTION, SOLUTION INTRAMUSCULAR; INTRAVENOUS at 11:11

## 2021-05-23 RX ADMIN — FENTANYL CITRATE 50 MCG: 50 INJECTION INTRAMUSCULAR; INTRAVENOUS at 14:56

## 2021-05-23 RX ADMIN — ONDANSETRON 4 MG: 2 INJECTION INTRAMUSCULAR; INTRAVENOUS at 05:14

## 2021-05-23 RX ADMIN — Medication 100 MG: at 10:50

## 2021-05-23 RX ADMIN — HYDROCODONE BITARTRATE AND ACETAMINOPHEN 2 TABLET: 5; 325 TABLET ORAL at 15:53

## 2021-05-23 RX ADMIN — FENTANYL CITRATE 100 MCG: 50 INJECTION, SOLUTION INTRAMUSCULAR; INTRAVENOUS at 10:50

## 2021-05-23 RX ADMIN — ONDANSETRON HYDROCHLORIDE 4 MG: 4 INJECTION, SOLUTION INTRAMUSCULAR; INTRAVENOUS at 12:06

## 2021-05-23 ASSESSMENT — PAIN DESCRIPTION - PAIN TYPE
TYPE: ACUTE PAIN
TYPE: ACUTE PAIN

## 2021-05-23 ASSESSMENT — ENCOUNTER SYMPTOMS
VOMITING: 0
EYE REDNESS: 0
NAUSEA: 0
SHORTNESS OF BREATH: 0
BACK PAIN: 0
RHINORRHEA: 0
SORE THROAT: 0
DIARRHEA: 0
CHEST TIGHTNESS: 0
COLOR CHANGE: 0
ABDOMINAL PAIN: 0
SINUS PAIN: 0
COUGH: 0

## 2021-05-23 ASSESSMENT — PULMONARY FUNCTION TESTS
PIF_VALUE: 14
PIF_VALUE: 13
PIF_VALUE: 13
PIF_VALUE: 14
PIF_VALUE: 5
PIF_VALUE: 14
PIF_VALUE: 1
PIF_VALUE: 14
PIF_VALUE: 1
PIF_VALUE: 13
PIF_VALUE: 12
PIF_VALUE: 14
PIF_VALUE: 12
PIF_VALUE: 1
PIF_VALUE: 13
PIF_VALUE: 14
PIF_VALUE: 17
PIF_VALUE: 12
PIF_VALUE: 14
PIF_VALUE: 1
PIF_VALUE: 15
PIF_VALUE: 19
PIF_VALUE: 14
PIF_VALUE: 14
PIF_VALUE: 13
PIF_VALUE: 12
PIF_VALUE: 13
PIF_VALUE: 14
PIF_VALUE: 13
PIF_VALUE: 14
PIF_VALUE: 12
PIF_VALUE: 14
PIF_VALUE: 18
PIF_VALUE: 10
PIF_VALUE: 0
PIF_VALUE: 14
PIF_VALUE: 10
PIF_VALUE: 1
PIF_VALUE: 14
PIF_VALUE: 13

## 2021-05-23 ASSESSMENT — LIFESTYLE VARIABLES: SMOKING_STATUS: 1

## 2021-05-23 ASSESSMENT — PAIN DESCRIPTION - ORIENTATION
ORIENTATION: LEFT
ORIENTATION: RIGHT;LEFT

## 2021-05-23 ASSESSMENT — PAIN SCALES - GENERAL
PAINLEVEL_OUTOF10: 0
PAINLEVEL_OUTOF10: 6
PAINLEVEL_OUTOF10: 8
PAINLEVEL_OUTOF10: 10
PAINLEVEL_OUTOF10: 6
PAINLEVEL_OUTOF10: 8
PAINLEVEL_OUTOF10: 8
PAINLEVEL_OUTOF10: 6

## 2021-05-23 ASSESSMENT — PAIN DESCRIPTION - ONSET: ONSET: ON-GOING

## 2021-05-23 ASSESSMENT — PAIN DESCRIPTION - PROGRESSION
CLINICAL_PROGRESSION: GRADUALLY IMPROVING
CLINICAL_PROGRESSION: NOT CHANGED

## 2021-05-23 ASSESSMENT — PAIN DESCRIPTION - LOCATION
LOCATION: ARM
LOCATION: FACE;ARM

## 2021-05-23 ASSESSMENT — PAIN - FUNCTIONAL ASSESSMENT: PAIN_FUNCTIONAL_ASSESSMENT: ACTIVITIES ARE NOT PREVENTED

## 2021-05-23 NOTE — PLAN OF CARE
Problem: Pain:  Goal: Pain level will decrease  Description: Pain level will decrease  Outcome: Ongoing  Note: Patient complaining of post-surgical pain of a 8,  was taking fentanyl every hour, now has an order for norco  Goal: Control of acute pain  Description: Control of acute pain  Outcome: Ongoing  Note: Patient complaining of post-surgical pain of a 8,  was taking fentanyl every hour, now has an order for norco  Goal: Control of chronic pain  Description: Control of chronic pain  Outcome: Completed   Care plan reviewed with patient. Patient verbalize understanding of the plan of care and contribute to goal setting.

## 2021-05-23 NOTE — ED NOTES
Dr. Vidal Currie, trauma PA, Dr. Chloé Eldridge at bedside to examine pt. Pt has controlled bleeding at this time.      Merary Looney RN  05/23/21 6532

## 2021-05-23 NOTE — ED NOTES
Patient transported to [address] [place] [address]  by cart in stable condition. IV infusing and line is patent.         Thu Canela, EMT-P  05/23/21 7413

## 2021-05-23 NOTE — ED NOTES
ED to inpatient nurses report    No chief complaint on file. Present to ED from home  LOC: alert and orientated to name, place, date  Vital signs   Vitals:    05/23/21 0421 05/23/21 0426 05/23/21 0505 05/23/21 0516   BP:  (!) 127/91 (!) 142/94 (!) 142/94   Pulse: 79  72 69   Resp: 18  14 11   Temp:       SpO2: 97%   98%   Weight:       Height:          Oxygen Baseline room air    Current needs required room air  Bipap/Cpap Yes  LDAs:   Peripheral IV 05/23/21 Right Antecubital (Active)   Site Assessment Clean;Dry; Intact 05/23/21 0252   Line Status Infusing 05/23/21 0252   Dressing Status Dry; Intact; Clean 05/23/21 0252       Peripheral IV 05/23/21 Right Wrist (Active)   Site Assessment Clean;Dry; Intact 05/23/21 0302   Line Status Blood return noted;Normal saline locked; Flushed 05/23/21 0302   Dressing Status Clean;Dry; Intact 05/23/21 0302   Dressing Intervention New 05/23/21 0302     Mobility: 1 assist  Pending ED orders: urine  Present condition: pt resting in bed with even and unlabored respirations. Pt vitals are stable. Please see initial note. Pt received fentanyl, dilaudid, Zofran, tetanus, ancef and fluids. Pt is stable.  Pt has dressins covering lip lac, left arm lac and right thumb lac. attempted to clean pt up as best as possible  Person of contract, phone number   Our promise was given to patient    Electronically signed by Dora Rondon RN on 5/23/2021 at 5:20 AM       Dora Rondon RN  05/23/21 5339

## 2021-05-23 NOTE — ED NOTES
Lab at bedside     Starla Rice, [facility]  05/23/21 4550
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
## 2021-05-23 NOTE — ED NOTES
Dr. Kenyatta Gamboa at bedside to suture wound. tourniquet loosened at this time. bleeding is controlled.       Magalie Penlaoza RN  05/23/21 0253       Magalie Penaloza RN  05/23/21 71 Adeola Love RN  05/23/21 9413

## 2021-05-23 NOTE — ANESTHESIA POSTPROCEDURE EVALUATION
Department of Anesthesiology  Postprocedure Note    Patient: Russell Terrazas  MRN: [id number]  YOB: 1990  Date of evaluation: 5/23/2021  Time:  12:59 PM     Procedure Summary     Date: 05/23/21 Room / Location: [facility] [place] OR [address] OR    Anesthesia Start: 1041 Anesthesia Stop: 1234    Procedures:       LEFT ARM WOUND EXPLORATION WITH INCISION AND DRAINAGE, AND WOUND REPAIR (Left )      FACIAL LACERATION REPAIR (N/A ) Diagnosis: (Left Arm Laceration)    Surgeons: Ozzy Gannon DO Responsible Provider: Felicity Rivas MD    Anesthesia Type: general ASA Status: 2          Anesthesia Type: general    Ivonne Phase I: Ivonne Score: 9    Ivonne Phase II:      Last vitals: Reviewed and per EMR flowsheets.        Anesthesia Post Evaluation    Patient location during evaluation: PACU  Patient participation: complete - patient participated  Level of consciousness: awake and alert  Airway patency: patent  Nausea & Vomiting: no nausea and no vomiting  Complications: no  Cardiovascular status: hemodynamically stable  Respiratory status: room air and spontaneous ventilation  Hydration status: stable

## 2021-05-23 NOTE — ANESTHESIA PRE PROCEDURE
Department of Anesthesiology  Preprocedure Note       Name:  Tony Billingsley   Age:  36 y.o.  :  1990                                          MRN:  [id number]         Date:  2021      Surgeon: Jimmy Panda):  Maribell Elliott DO    Procedure: Procedure(s):  LEFT ARM WOUND EXPLORATION WITH INCISION AND DRAINAGE, AND WOUND REPAIR    Medications prior to admission:   Prior to Admission medications    Medication Sig Start Date End Date Taking? Authorizing Provider   montelukast (SINGULAIR) 10 MG tablet Take 1 tablet by mouth nightly 19  Yes GOSIA Leblanc CNP   EPINEPHrine (AUVI-Q) 0.3 MG/0.3ML SOAJ injection Dispense 2 packs of 2 (total 4 devices). Use as directed, STAT for allergic reaction.  19   GOSIA Leblanc CNP   levocetirizine Maria Spore ALLERGY 24HR) 5 MG tablet Take 1 tablet by mouth nightly 19  GOSIA Leblanc CNP   Multiple Vitamins-Minerals (MULTIVITAMIN PO) Take by mouth daily    Historical Provider, MD       Current medications:    Current Facility-Administered Medications   Medication Dose Route Frequency Provider Last Rate Last Admin    sodium chloride flush 0.9 % injection 5-40 mL  5-40 mL Intravenous 2 times per day NADINE Alejo   10 mL at 21 0813    sodium chloride flush 0.9 % injection 5-40 mL  5-40 mL Intravenous PRN NADINE Mack        0.9 % sodium chloride infusion  25 mL Intravenous PRN NDAINE Munoz        acetaminophen (TYLENOL) tablet 650 mg  650 mg Oral Q4H PRN NADINE Mack        promethazine (PHENERGAN) tablet 12.5 mg  12.5 mg Oral Q6H PRN NADINE Mack        Or    ondansetron (ZOFRAN) injection 4 mg  4 mg Intravenous Q6H PRN NADINE Mack        polyethylene glycol (GLYCOLAX) packet 17 g  17 g Oral Daily PRN NADINE Mack        0.9 % sodium chloride infusion   Intravenous Continuous NADINE Mack 125 mL/hr at 21 0601 New Bag at 21 0601    fentaNYL (SUBLIMAZE) injection 25 mcg  25 mcg Intravenous Q1H PRN NADINE Mack        Or    fentaNYL (SUBLIMAZE) injection 50 mcg  50 mcg Intravenous Q1H PRN NADINE Mack   50 mcg at 21 0813    famotidine (PEPCID) injection 20 mg  20 mg Intravenous BID NADINE Mack   20 mg at 21 0813    ondansetron (ZOFRAN) 4 MG/2ML injection             morphine 4 MG/ML injection                Allergies: Allergies   Allergen Reactions    Bee Venom Swelling    Morphine Nausea Only    Seasonal        Problem List:    Patient Active Problem List   Diagnosis Code    Tonsillar hypertrophy J35.1    Recurrent tonsillitis J03.91    Tonsil stone J35.8    Halitosis R19.6    Smoker F17.200    Arm laceration with complication, left, initial encounter S41.112A    Face lacerations, initial encounter O73.83QW       Past Medical History:  History reviewed. No pertinent past medical history.     Past Surgical History:        Procedure Laterality Date    BACK SURGERY      TIBIA FRACTURE SURGERY Right     X5    TIBIA FRACTURE SURGERY Right 2008    TONSILLECTOMY N/A 2019    TONSILLECTOMY performed by Ramo Ansari MD at [address] [place] [address]       Social History:    Social History     Tobacco Use    Smoking status: Current Some Day Smoker     Packs/day: 0.25     Years: 5.00     Pack years: 1.25     Types: Cigarettes     Last attempt to quit: 2019     Years since quittin.8    Smokeless tobacco: Never Used    Tobacco comment: quit for surgery   Substance Use Topics    Alcohol use: Yes     Comment: once weekends                                Ready to quit: No  Counseling given: No  Comment: quit for surgery      Vital Signs (Current):   Vitals:    21 0426 21 0505 21 0516 21 0539   BP: (!) 127/91 (!) 142/94 (!) 142/94 119/81   Pulse:  72 69 60   Resp:  14 11 16   Temp:    98.9 °F (37.2 °C)   TempSrc:    Oral   SpO2:   98% 98%   Weight:       Height:                                                  BP Readings from Last 3 Encounters:   05/23/21 119/81   08/08/19 124/80   07/22/19 (!) 152/97       NPO Status:                                                                                 BMI:   Wt Readings from Last 3 Encounters:   05/23/21 220 lb (99.8 kg)   08/08/19 220 lb 6.4 oz (100 kg)   07/22/19 226 lb 3.2 oz (102.6 kg)     Body mass index is 29.03 kg/m². CBC:   Lab Results   Component Value Date    WBC 10.7 05/23/2021    RBC 4.05 05/23/2021    HGB 12.4 05/23/2021    HCT 37.0 05/23/2021    MCV 91.4 05/23/2021     05/23/2021       CMP:   Lab Results   Component Value Date     05/23/2021    K 4.2 05/23/2021     05/23/2021    CO2 22 05/23/2021    BUN 12 05/23/2021    CREATININE 0.8 05/23/2021    LABGLOM >90 05/23/2021    GLUCOSE 94 05/23/2021    PROT 6.8 05/23/2021    CALCIUM 8.0 05/23/2021    BILITOT 0.3 05/23/2021    ALKPHOS 40 05/23/2021    AST 21 05/23/2021    ALT 16 05/23/2021       POC Tests: No results for input(s): POCGLU, POCNA, POCK, POCCL, POCBUN, POCHEMO, POCHCT in the last 72 hours.     Coags:   Lab Results   Component Value Date    INR 1.05 05/23/2021       HCG (If Applicable): No results found for: PREGTESTUR, PREGSERUM, HCG, HCGQUANT     ABGs: No results found for: PHART, PO2ART, KLL1KKH, LTS9QKN, BEART, I2LPDUUX     Type & Screen (If Applicable):  Lab Results   Component Value Date    LABRH POS 05/23/2021       Drug/Infectious Status (If Applicable):  No results found for: HIV, HEPCAB    COVID-19 Screening (If Applicable): No results found for: COVID19        Anesthesia Evaluation    Airway: Mallampati: II  TM distance: >3 FB   Neck ROM: full  Comment: Lower lip laceration  Mouth opening: > = 3 FB Dental:          Pulmonary:   (+) decreased breath sounds,  current smoker                           Cardiovascular:            Rhythm: regular                      Neuro/Psych:               GI/Hepatic/Renal:             Endo/Other:                     Abdominal: Vascular:                                        Anesthesia Plan      general     ASA 2       Induction: intravenous. MIPS: Postoperative opioids intended and Prophylactic antiemetics administered. Anesthetic plan and risks discussed with patient. Plan discussed with CRNA.                   Felicity Rivas MD   5/23/2021

## 2021-05-23 NOTE — BRIEF OP NOTE
Brief Postoperative Note      Patient: Tony Billingsley  YOB: 1990  MRN: [id number]    Date of Procedure: 5/23/2021    Pre-Op Diagnosis: Left Arm, forearm and thumb Laceration, right thumb laceration    Post-Op Diagnosis: Same with partial bicep muscle laceration ~60% and 30% brachialis laceration       Procedure(s):  LEFT ARM WOUND EXPLORATION WITH IRRIGATION AND DEBRIDEMENT, AND WOUND CLOSURE 25 CM  PARTIAL BICEPS AND BRACHIALIS MUSCLE BELLY REPAIR    Surgeon(s):  DO Kyree Costa MD    Assistant:  Physician Assistant: Alyssa Pena PA-C    Anesthesia: General    Estimated Blood Loss (mL): Minimal    Complications: None    Specimens:   * No specimens in log *    Implants:  * No implants in log *      Drains: * No LDAs found *    Findings: post op diagnosis confirmed, no NV structure involvement     Electronically signed by Maribell Elliott DO on 5/23/2021 at 12:16 PM
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
## 2021-05-23 NOTE — PROGRESS NOTES
1226-Pt arrived in the PACU.  Pt denies pain at this time  1230- Pt complained of pain at 7/10 medicated per [facility]  1256- Report called to [employer] [facility], all question answered  1304- Pt transport arrived to take the pt to [id number]
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
## 2021-05-23 NOTE — ED NOTES
tourniquet released at this time.  Pt has bilateral 2+ radial pulses      Jelly Reynoso, RN  05/23/21 1770 [facility] [address], RN  05/23/21 3960

## 2021-05-23 NOTE — PROGRESS NOTES
Patient arrives to the pre-op holding area. Dr. Scarlet Sue and Dr. Willy Menchaca have seen the patient. Safety checks are performed. Patient's temperature is 97.9°F.
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
## 2021-05-24 VITALS
DIASTOLIC BLOOD PRESSURE: 78 MMHG | RESPIRATION RATE: 16 BRPM | BODY MASS INDEX: 29.16 KG/M2 | HEART RATE: 80 BPM | OXYGEN SATURATION: 97 % | HEIGHT: 73 IN | WEIGHT: 220 LBS | TEMPERATURE: 98 F | SYSTOLIC BLOOD PRESSURE: 129 MMHG

## 2021-05-24 LAB
ALBUMIN SERPL-MCNC: 3.6 G/DL (ref 3.5–5.1)
ALP BLD-CCNC: 42 U/L (ref 38–126)
ALT SERPL-CCNC: 11 U/L (ref 11–66)
ANION GAP SERPL CALCULATED.3IONS-SCNC: 7 MEQ/L (ref 8–16)
AST SERPL-CCNC: 19 U/L (ref 5–40)
BILIRUB SERPL-MCNC: 0.5 MG/DL (ref 0.3–1.2)
BUN BLDV-MCNC: 10 MG/DL (ref 7–22)
CALCIUM SERPL-MCNC: 8 MG/DL (ref 8.5–10.5)
CHLORIDE BLD-SCNC: 106 MEQ/L (ref 98–111)
CO2: 25 MEQ/L (ref 23–33)
CREAT SERPL-MCNC: 0.7 MG/DL (ref 0.4–1.2)
GFR SERPL CREATININE-BSD FRML MDRD: > 90 ML/MIN/1.73M2
GLUCOSE BLD-MCNC: 109 MG/DL (ref 70–108)
HCT VFR BLD CALC: 33.3 % (ref 42–52)
HEMOGLOBIN: 11.3 GM/DL (ref 14–18)
POTASSIUM REFLEX MAGNESIUM: 3.8 MEQ/L (ref 3.5–5.2)
SODIUM BLD-SCNC: 138 MEQ/L (ref 135–145)
TOTAL PROTEIN: 6 G/DL (ref 6.1–8)

## 2021-05-24 PROCEDURE — 85014 HEMATOCRIT: CPT

## 2021-05-24 PROCEDURE — APPSS60 APP SPLIT SHARED TIME 46-60 MINUTES: Performed by: NURSE PRACTITIONER

## 2021-05-24 PROCEDURE — 85018 HEMOGLOBIN: CPT

## 2021-05-24 PROCEDURE — 2580000003 HC RX 258: Performed by: ORTHOPAEDIC SURGERY

## 2021-05-24 PROCEDURE — 6370000000 HC RX 637 (ALT 250 FOR IP): Performed by: PHYSICIAN ASSISTANT

## 2021-05-24 PROCEDURE — 80053 COMPREHEN METABOLIC PANEL: CPT

## 2021-05-24 PROCEDURE — APPNB180 APP NON BILLABLE TIME > 60 MINS: Performed by: NURSE PRACTITIONER

## 2021-05-24 PROCEDURE — 36415 COLL VENOUS BLD VENIPUNCTURE: CPT

## 2021-05-24 RX ORDER — HYDROCODONE BITARTRATE AND ACETAMINOPHEN 5; 325 MG/1; MG/1
1 TABLET ORAL EVERY 4 HOURS PRN
Qty: 20 TABLET | Refills: 0 | Status: SHIPPED | OUTPATIENT
Start: 2021-05-24 | End: 2021-05-29

## 2021-05-24 RX ADMIN — SODIUM CHLORIDE: 9 INJECTION, SOLUTION INTRAVENOUS at 03:30

## 2021-05-24 RX ADMIN — HYDROCODONE BITARTRATE AND ACETAMINOPHEN 2 TABLET: 5; 325 TABLET ORAL at 08:05

## 2021-05-24 RX ADMIN — HYDROCODONE BITARTRATE AND ACETAMINOPHEN 2 TABLET: 5; 325 TABLET ORAL at 04:01

## 2021-05-24 RX ADMIN — HYDROCODONE BITARTRATE AND ACETAMINOPHEN 2 TABLET: 5; 325 TABLET ORAL at 00:02

## 2021-05-24 ASSESSMENT — PAIN SCALES - GENERAL
PAINLEVEL_OUTOF10: 8
PAINLEVEL_OUTOF10: 6
PAINLEVEL_OUTOF10: 8
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
## 2021-05-24 NOTE — PLAN OF CARE
Problem: Pain:  Goal: Pain level will decrease  Description: Pain level will decrease  5/24/2021 0225 by Larissa Bay RN  Outcome: Ongoing  Note: Pt is taking norco for pain. Rating between 6-8/10 for pain. Problem: Pain:  Goal: Control of acute pain  Description: Control of acute pain  5/24/2021 0225 by Larissa Bay RN  Outcome: Ongoing     Problem: Discharge Planning:  Goal: Discharged to appropriate level of care  Description: Discharged to appropriate level of care  Outcome: Ongoing  Note: Pt plans to return home upon discharge. No questions at this time. Problem: Skin Integrity/Risk  Goal: Wound healing  Outcome: Ongoing  Note: Pt is ace wrapped on the left arm from surgery. Bacitracin applied to chip/lip wound. Care plan reviewed with patient and brother. Patient and brother verbalize understanding of the plan of care and contribute to goal setting.
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
## 2021-05-24 NOTE — PROGRESS NOTES
diet.  Plan is for discharge home today. He will maintain the splint. Follow up in office in 2 weeks. Wt Readings from Last 3 Encounters:   05/24/21 220 lb (99.8 kg)   08/08/19 220 lb 6.4 oz (100 kg)   07/22/19 226 lb 3.2 oz (102.6 kg)     Temp Readings from Last 3 Encounters:   05/24/21 98 °F (36.7 °C) (Oral)   05/23/21 96.6 °F (35.9 °C)   08/08/19 98.9 °F (37.2 °C) (Oral)     BP Readings from Last 3 Encounters:   05/24/21 129/78   05/23/21 114/85   08/08/19 124/80     Pulse Readings from Last 3 Encounters:   05/24/21 80   08/08/19 64   07/22/19 (!) 49       24 HR INTAKE/OUTPUT :     Intake/Output Summary (Last 24 hours) at 5/24/2021 1002  Last data filed at 5/24/2021 0933  Gross per 24 hour   Intake 4561.43 ml   Output    Net 4561.43 ml     DIET GENERAL;    OBJECTIVE  CURRENT VITALS /78   Pulse 80   Temp 98 °F (36.7 °C) (Oral)   Resp 16   Ht 6' 1\" (1.854 m)   Wt 220 lb (99.8 kg)   SpO2 97%   BMI 29.03 kg/m²   GENERAL: alert, cooperative, no distress  LUNGS: clear to auscultation bilaterally- no wheezes, rales or rhonchi, normal air movement, no respiratory distress  HEART: normal rate, normal S1 and S2, no gallops, intact distal pulses and no carotid bruits  ABDOMEN: soft, non-tender, non-distended, normal bowel sounds, no masses or organomegaly  WOUNDS: Ace wrap and splint to left arm are dry and intact. Surgical incisions to left arm are not visualized. Facial laceration/incision with sutures that are intact.    EXTREMITY: no cyanosis, no clubbing and no edema      LABS  CBC :   Recent Labs     05/23/21  0305 05/23/21  0645 05/24/21  0700   WBC 6.8 10.7  --    HGB 12.6* 12.4* 11.3*   HCT 37.8* 37.0* 33.3*   MCV 92.4 91.4  --     193  --      BMP:   Recent Labs     05/23/21  0305 05/23/21  0645 05/24/21  0700    138 138   K 3.4* 4.2 3.8    104 106   CO2 20* 22* 25   BUN 15 12 10   CREATININE 1.0 0.8 0.7     COAGS:   Recent Labs     05/23/21  0305 05/24/21  0700   PROT 6.8 6.0*   INR 1.05  --      Pancreas/HFP:  No results for input(s): LIPASE, AMYLASE in the last 72 hours.   Recent Labs     05/23/21  0305 05/24/21  0700   AST 21 19   ALT 16 11   BILITOT 0.3 0.5   ALKPHOS 40 42     RADIOLOGY:  No new results        Electronically signed by GOSIA Parnell CNP on 5/24/2021 at 10:03 AM

## 2021-05-24 NOTE — PROGRESS NOTES
Physician Progress Note      Ariadna Garcia  CSN #:                  [id number]  :                       1990  ADMIT DATE:       2021 2:48 AM  DISCH DATE:        2021 11:00 AM  RESPONDING  PROVIDER #:        Geraldo AMADOR DO          QUERY TEXT:    Patient admitted with left upper arm laceration. Per Op note dated    documentation of debridement. To accurately reflect the procedure performed   please document if debridement was excisional or nonexcisional and the deepest   depth of tissue removed as down to and including:  Options provided:  -- Nonexcisional debridement of skin  -- Excisional debridement of skin  -- Nonexcisional debridement down to and including subcutaneous tissue/fascia  -- Excisional debridement down to and including subcutaneous tissue/fascia  -- Nonexcisional debridement down to and including muscle  -- Excisional debridement down to and including muscle  -- Other - I will add my own diagnosis  -- Disagree - Not applicable / Not valid  -- Disagree - Clinically unable to determine / Unknown  -- Refer to Clinical Documentation Reviewer    PROVIDER RESPONSE TEXT:    Excisional debridement down to and including subcutaneous tissue of left upper   arm was performed during procedure on .     Query created by: Fred Suero on 2021 10:53 AM      Electronically signed by:  Edison Armando DO 2021 6:10 PM

## 2021-05-24 NOTE — PROGRESS NOTES
Discharge teaching and instructions for diagnosis/procedure of trauma completed with patient using teachback method. AVS reviewed. Patient voiced understanding regarding prescriptions, follow up appointments, and care of self at home.  Discharged ambulatory to  home with support per family

## 2021-05-24 NOTE — PROGRESS NOTES
[address]  PHYSICAL THERAPY MISSED TREATMENT NOTE  [facility]    Date: 2021  Patient Name: Maurice Aponte        MRN: [id number]   : 1990  (36 y.o.)  Gender: male                REASON FOR MISSED TREATMENT:  Missed Treat. Per RN pt up in room independent, steady, no LOB. Will discontinue PT and defer to OT for ADLs.
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
## 2021-06-04 NOTE — PROGRESS NOTES
CLINICAL PHARMACY NOTE: MEDS TO BEDS    Total # of Prescriptions Filled: 1   The following medications were delivered to the patient:  Evelyn Peterson [phone]    Additional Documentation:

## 2021-06-08 ENCOUNTER — OFFICE VISIT (OUTPATIENT)
Dept: SURGERY | Age: 31
End: 2021-06-08

## 2021-06-08 VITALS
WEIGHT: 186 LBS | RESPIRATION RATE: 14 BRPM | SYSTOLIC BLOOD PRESSURE: 130 MMHG | TEMPERATURE: 97.2 F | HEIGHT: 73 IN | OXYGEN SATURATION: 98 % | HEART RATE: 67 BPM | DIASTOLIC BLOOD PRESSURE: 70 MMHG | BODY MASS INDEX: 24.65 KG/M2

## 2021-06-08 DIAGNOSIS — Z09 POSTOP CHECK: Primary | ICD-10-CM

## 2021-06-08 PROCEDURE — 99024 POSTOP FOLLOW-UP VISIT: CPT | Performed by: SURGERY

## 2021-06-08 NOTE — PROGRESS NOTES
Lisandro Stapleton MD  [address]  Clinic Post op Note    Pt Name: Tosin Kelley  Medical Record Number: [id number]  Date of Birth 1990   Today's Date: 6/8/2021    ASSESSMENT       ICD-10-CM    1. Postop check  Z09         PLAN   1. Sutures removed, no sign of infection to have localized suture reaction. Continue to avoid sun exposure massage scars daily. 2. He can see me back as needed  3. Has follow up with orthopedics for biceps laceration follow up, he is to keep his arm in splint until follow up     Claire Bueno is doing okay, he is concerned he has infection at his incision. He has not had any drainage. .         CURRENT MEDICATIONS     Current Outpatient Medications on File Prior to Visit   Medication Sig Dispense Refill    EPINEPHrine (AUVI-Q) 0.3 MG/0.3ML SOAJ injection Dispense 2 packs of 2 (total 4 devices). Use as directed, STAT for allergic reaction. 4 each 2    montelukast (SINGULAIR) 10 MG tablet Take 1 tablet by mouth nightly 30 tablet 5    levocetirizine (XYZAL ALLERGY 24HR) 5 MG tablet Take 1 tablet by mouth nightly 30 tablet 11    Multiple Vitamins-Minerals (MULTIVITAMIN PO) Take by mouth daily       No current facility-administered medications on file prior to visit. OBJECTIVE   CURRENT VITALS:  height is 6' 1\" (1.854 m) and weight is 186 lb (84.4 kg). His tympanic temperature is 97.2 °F (36.2 °C). His blood pressure is 130/70 and his pulse is 67. His respiration is 14 and oxygen saturation is 98%. He is alert oriented appropriate he is in no acute distress  His vermilion border is nicely aligned there is some swelling on the flap from his incision. There is local suture reaction all sutures removed. He is also having suture reaction on his thumb incision these were removed as well.     LABS and Pathology   na    RADIOLOGY   na    Electronically signed by Lisandro Stapleton MD on 6/8/2021 at 1:39 PM

## 2021-06-29 ASSESSMENT — ENCOUNTER SYMPTOMS
RHINORRHEA: 0
SINUS PAIN: 0
DIARRHEA: 0
COUGH: 0
EYE REDNESS: 0
ABDOMINAL PAIN: 0
SORE THROAT: 0
BACK PAIN: 0
VOMITING: 0
NAUSEA: 0
SHORTNESS OF BREATH: 0
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
## 2021-07-01 ENCOUNTER — OFFICE VISIT (OUTPATIENT)
Dept: SURGERY | Age: 31
End: 2021-07-01

## 2021-07-01 VITALS
RESPIRATION RATE: 18 BRPM | HEIGHT: 73 IN | WEIGHT: 187 LBS | TEMPERATURE: 97 F | HEART RATE: 78 BPM | OXYGEN SATURATION: 99 % | BODY MASS INDEX: 24.78 KG/M2

## 2021-07-01 DIAGNOSIS — Z51.89 VISIT FOR WOUND CHECK: ICD-10-CM

## 2021-07-01 DIAGNOSIS — S01.81XA FACE LACERATIONS, INITIAL ENCOUNTER: Primary | ICD-10-CM

## 2021-07-01 PROCEDURE — 99024 POSTOP FOLLOW-UP VISIT: CPT | Performed by: SURGERY

## 2021-07-06 ENCOUNTER — OFFICE VISIT (OUTPATIENT)
Dept: SURGERY | Age: 31
End: 2021-07-06
Payer: COMMERCIAL

## 2021-07-06 VITALS
TEMPERATURE: 97.3 F | SYSTOLIC BLOOD PRESSURE: 125 MMHG | HEIGHT: 73 IN | BODY MASS INDEX: 24.65 KG/M2 | HEART RATE: 63 BPM | DIASTOLIC BLOOD PRESSURE: 88 MMHG | WEIGHT: 186 LBS

## 2021-07-06 DIAGNOSIS — S09.93XS FACIAL INJURY, SEQUELA: Primary | ICD-10-CM

## 2021-07-06 PROCEDURE — 99244 OFF/OP CNSLTJ NEW/EST MOD 40: CPT | Performed by: SURGERY

## 2021-07-06 PROCEDURE — G8420 CALC BMI NORM PARAMETERS: HCPCS | Performed by: SURGERY

## 2021-07-06 PROCEDURE — 4004F PT TOBACCO SCREEN RCVD TLK: CPT | Performed by: SURGERY

## 2021-07-06 PROCEDURE — G8427 DOCREV CUR MEDS BY ELIG CLIN: HCPCS | Performed by: SURGERY
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
## 2021-07-07 ASSESSMENT — VISUAL ACUITY: OU: 1

## 2021-07-07 ASSESSMENT — ENCOUNTER SYMPTOMS
COUGH: 0
ABDOMINAL PAIN: 0
COLOR CHANGE: 0
PHOTOPHOBIA: 0
SINUS PRESSURE: 0
SHORTNESS OF BREATH: 0
TROUBLE SWALLOWING: 0
FACIAL SWELLING: 0
SINUS PAIN: 0
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
## 2021-07-19 ENCOUNTER — APPOINTMENT (OUTPATIENT)
Dept: OCCUPATIONAL THERAPY | Age: 31
End: 2021-07-19
Payer: COMMERCIAL

## 2021-07-22 ENCOUNTER — HOSPITAL ENCOUNTER (OUTPATIENT)
Dept: OCCUPATIONAL THERAPY | Age: 31
Setting detail: THERAPIES SERIES
Discharge: HOME | End: 2021-07-22
Payer: COMMERCIAL

## 2021-07-22 PROCEDURE — 97165 OT EVAL LOW COMPLEX 30 MIN: CPT
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
## 2021-07-27 ENCOUNTER — HOSPITAL ENCOUNTER (OUTPATIENT)
Dept: OCCUPATIONAL THERAPY | Age: 31
Setting detail: THERAPIES SERIES
Discharge: HOME | End: 2021-07-27
Payer: COMMERCIAL

## 2021-07-27 PROCEDURE — 97110 THERAPEUTIC EXERCISES: CPT
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
## 2021-07-29 ENCOUNTER — HOSPITAL ENCOUNTER (OUTPATIENT)
Dept: OCCUPATIONAL THERAPY | Age: 31
Setting detail: THERAPIES SERIES
Discharge: HOME | End: 2021-07-29
Payer: COMMERCIAL

## 2021-07-29 PROCEDURE — 97140 MANUAL THERAPY 1/> REGIONS: CPT

## 2021-07-29 NOTE — PROGRESS NOTES
[address]  [] EVALUATION  [x] DAILY NOTE (LAND) [] DAILY NOTE (AQUATIC ) [] PROGRESS NOTE [] DISCHARGE NOTE    [] [facility]   [x] [facility]    [] [facility]   [] Terance Snellen    Date: 2021  Patient Name:  Adelia Combs  : 1990  MRN: [id number]  CSN: [id number]    Referring Practitioner Cuoc              Diagnosis Laceration of unspecified muscles, fascia and tendons at forearm level, left arm, subsequent encounter [S56.922D]  Laceration without foreign body of left thumb without damage to nail, subsequent encounter [S61.012D]  Laceration without foreign body of right thumb without damage to nail, subsequent encounter [S61.011D   Treatment Diagnosis Left arm laceration   Date of Evaluation 21      Functional Outcome Measure Used UEFS   Functional Outcome Score 51/80 (21)       Insurance: Primary: Payor: Tammie Chaudhry /  /  / ,   Secondary:    Authorization Information: Evaluation only; precert after evaluation; Visit # 3, 3/10 for progress note; 3/180 units   Visits Allowed: 180 units  - 10/23/21; CPT [phone] and all other codes   Recertification Date:    Physician Follow-Up: 21   Physician Orders: Evaluate and treat; start strengthening in 2 weeks (dated 21)   Pertinent History: Patient injured left arm on 21 when he went through a glass window. Had lacerations of left bicep, both thumbs, and left forearm. Had surgical repair of lacerations and of left bicep muscle belly. Patient was in long arm splint after surgery. Patient is not sure how long he was in the splint. Patient was placed in left elbow hinge brace on 21. Patient states that he was only supposed to wear that brace for a couple of weeks. Patient states that he didn't wear the brace as he was instructed (states that he only wore the brace at night).  Patient had a no show last week for therapy and then cancelled an evaluation appointment earlier this week. No comorbidities present that could influence rehab process. SUBJECTIVE: Reports that the scar area \"doesn't feel the same. \" Patient admits that he has been doing pushups. Reported that he is not going to stop doing them even after he was instructed to not do them until after he gets the physician approval.    TREATMENT   Precautions: Dr. Omer Hams distal bicep repair protocol   Pain:  2/10  \"numbness\" LOCATION: left bicep scar    X in shaded column indicates Activity Completed Today   Modalities Parameters/  Location  Notes/Comments                     Manual Therapy Time/  Technique  Notes/Comments   Desensitization to left bicep scar using different parts of towel and soft brush 2 minutes each x    Scar massage to left bicep scar  x          Exercises   Sets/  Sec Reps  Notes/Comments   Passive left forearm supination stretch 1 10 x    Peach theraband - left elbow flexion with palm up, thumb up, and palm down 1 15 each                                        Activities Time    Notes/Comments   Extensive education regarding what he can and cannot do regarding use of left bicep  x Patient became upset when he was told that he shouldn't do any pushups. States that he has been doing pushups. Specific Interventions Next Treatment: scar management, strengthening per physician protocol    Activity/Treatment Tolerance:  [x]  Patient tolerated treatment well  []  Patient limited by fatigue  []  Patient limited by pain   []  Patient limited by other medical complications  []  Other:      Assessment:  Progressing toward goals. Scar appears to be lighter in color and slightly less raised. Education continues to be completed with patient regarding of importance of not doing too much with his left arm. Patient became upset when he was told to not do any pushups until he sees the doctor next week.  Patient left the clinic as OTR was confirming his next appointment. OTR left message with Daiana at Dr. Ana Lilia Vidal office regarding patient's non-compliance with protocol. GOALS:  Patient Goal: Have full mobility in my left arm. Short Term Goals:  Time Frame: 4 weeks  1. Be independent with HEP as instructed and report following protocol limitations to allow patient to eventually return to normal working tasks. 2. Increase left active supination to 85 to increase his ability to use left arm to lift furniture at work with good technique. 3. Report pain going no higher than 1/10 in left elbow at any time to increase his ability to complete normal daily routines. Long Term Goals:  Time Frame: 8 weeks  1. Report being able to complete all normal work tasks without difficulty. 2. 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 3. Report having no difficulty with any aspect of ADL due to left UE. Patient Education:   [x]  HEP/Education Completed: desensitization techniques; instructed to NOT do any pushups     []  No new Education completed  []  Reviewed Prior HEP      [x]  Patient verbalized and/or demonstrated understanding of education provided. []  Patient unable to verbalize and/or demonstrate understanding of education provided. Will continue education. [x]  Barriers to learning: none    PLAN:      []  Plan of care initiated. Plan to see patient 2 times per week for 12 weeks to address the treatment planned outlined above.   [x]  Continue with current plan of care -  []  Modify plan of care as follows:    []  Hold pending physician visit  []  Discharge    Time In 1105   Time Out 1130   Timed Code Minutes: 25 min   Total Treatment Time: 25 min       Monica Sanford OTR/THERESA #[id number]

## 2021-08-17 ENCOUNTER — OFFICE VISIT (OUTPATIENT)
Dept: SURGERY | Age: 31
End: 2021-08-17
Payer: COMMERCIAL

## 2021-08-17 VITALS
SYSTOLIC BLOOD PRESSURE: 133 MMHG | TEMPERATURE: 97.2 F | HEART RATE: 83 BPM | BODY MASS INDEX: 25.42 KG/M2 | HEIGHT: 73 IN | WEIGHT: 191.8 LBS | DIASTOLIC BLOOD PRESSURE: 89 MMHG

## 2021-08-17 DIAGNOSIS — S09.93XS FACIAL INJURY, SEQUELA: Primary | ICD-10-CM

## 2021-08-17 PROCEDURE — 99213 OFFICE O/P EST LOW 20 MIN: CPT | Performed by: SURGERY

## 2021-08-17 PROCEDURE — 4004F PT TOBACCO SCREEN RCVD TLK: CPT | Performed by: SURGERY

## 2021-08-17 PROCEDURE — G8419 CALC BMI OUT NRM PARAM NOF/U: HCPCS | Performed by: SURGERY

## 2021-08-17 PROCEDURE — G8427 DOCREV CUR MEDS BY ELIG CLIN: HCPCS | Performed by: SURGERY

## 2021-08-17 NOTE — PROGRESS NOTES
nosebleeds, sinus pressure, sinus pain and trouble swallowing. Eyes: Negative for photophobia and visual disturbance. Respiratory: Negative for cough and shortness of breath. Cardiovascular: Negative for chest pain and leg swelling. Gastrointestinal: Negative for abdominal pain. Endocrine: Negative for cold intolerance and heat intolerance. Musculoskeletal: Negative for neck pain and neck stiffness. Skin: Negative for color change, pallor, rash and wound. Neurological: Negative for dizziness, facial asymmetry, light-headedness, numbness and headaches. Hematological: Does not bruise/bleed easily. Objective:     /89   Pulse 83   Temp 97.2 °F (36.2 °C)   Ht 6' 1\" (1.854 m)   Wt 191 lb 12.8 oz (87 kg)   BMI 25.30 kg/m²     Physical Exam  Vitals and nursing note reviewed. Constitutional:       General: He is not in acute distress. Appearance: Normal appearance. He is normal weight. HENT:      Head: Normocephalic. Jaw: There is normal jaw occlusion. No trismus, tenderness, swelling, pain on movement or malocclusion. Comments: Significantly less swelling of the area from initial evaluation. Jose Dry is nearly in alignment. There are small areas consistent with ingrown hair causing punctate swelling and erythema. Right Ear: Hearing and external ear normal.      Left Ear: Hearing and external ear normal.      Nose: Nose normal.      Mouth/Throat:      Lips: Pink. Mouth: Mucous membranes are moist.   Eyes:      General: Lids are normal. Vision grossly intact. Right eye: No discharge. Left eye: No discharge. Extraocular Movements: Extraocular movements intact. Conjunctiva/sclera: Conjunctivae normal.      Pupils: Pupils are equal, round, and reactive to light. Neck:      Thyroid: No thyromegaly. Trachea: Trachea and phonation normal. No tracheal deviation. Cardiovascular:      Rate and Rhythm: Normal rate.       Pulses: Normal pulses. No decreased pulses. Pulmonary:      Effort: Pulmonary effort is normal. No respiratory distress. Abdominal:      General: Abdomen is flat. There is no distension. Tenderness: There is no abdominal tenderness. Musculoskeletal:         General: No deformity. Normal range of motion. Cervical back: Full passive range of motion without pain and normal range of motion. Skin:     General: Skin is warm. Capillary Refill: Capillary refill takes less than 2 seconds. Findings: No erythema or rash. Neurological:      General: No focal deficit present. Mental Status: He is alert and oriented to person, place, and time. Mental status is at baseline. Cranial Nerves: No cranial nerve deficit. Sensory: No sensory deficit. Psychiatric:         Mood and Affect: Mood normal.         Behavior: Behavior normal. Behavior is cooperative. Thought Content: Thought content normal.         Judgment: Judgment normal.       Physical Exam   Head   Head comments: Significantly less swelling of the area from initial evaluation. Carol Otoolea is nearly in alignment. There are small areas consistent with ingrown hair causing punctate swelling and erythema. Assessment/Plan:   Assessment: complex wound of the right lower lip and chin S/P repair with excellent healing of the area. Plan: continuation of massage to the area, likely will not require any revision    There were no encounter diagnoses. No orders of the defined types were placed in this encounter. No follow-ups on file. Patient given educational materials - see patient instructions. Discussed use, benefit, and side effects of prescribed medications. All patient questions answered. Pt voiced understanding. Reviewed health maintenance.        Electronically signed Anita Calderon MD on 8/17/2021 at 1:39 PM EDT

## 2021-08-23 ASSESSMENT — ENCOUNTER SYMPTOMS
COUGH: 0
ABDOMINAL PAIN: 0
PHOTOPHOBIA: 0
TROUBLE SWALLOWING: 0
COLOR CHANGE: 0
FACIAL SWELLING: 1
SINUS PAIN: 0
SHORTNESS OF BREATH: 0
SINUS PRESSURE: 0

## 2021-08-23 ASSESSMENT — VISUAL ACUITY: OU: 1

## 2021-08-24 ENCOUNTER — TELEPHONE (OUTPATIENT)
Dept: SURGERY | Age: 31
End: 2021-08-24

## 2021-08-24 NOTE — TELEPHONE ENCOUNTER
After speaking with Dr. Kelly Salazar, he said there is nothing more he will be able to do for this pt. I called Humble Palomares to let him know that as well as to keep massaging the area as the swelling will go down soon.

## 2021-12-10 ENCOUNTER — HOSPITAL ENCOUNTER (EMERGENCY)
Age: 31
Discharge: HOME | End: 2021-12-10

## 2022-03-29 ENCOUNTER — HOSPITAL ENCOUNTER (OUTPATIENT)
Age: 32
Discharge: HOME | End: 2022-03-29
Payer: COMMERCIAL

## 2022-03-29 PROCEDURE — 87636 SARSCOV2 & INF A&B AMP PRB: CPT

## 2022-03-30 LAB
INFLUENZA A: NOT DETECTED
INFLUENZA B: NOT DETECTED
SARS-COV-2 RNA, RT PCR: NOT DETECTED

## 2022-12-09 ENCOUNTER — TELEPHONE (OUTPATIENT)
Dept: ENT CLINIC | Age: 32
End: 2022-12-09

## 2022-12-09 NOTE — TELEPHONE ENCOUNTER
Patient called in stating he is having allergy issues and needs to get a new prescription for them. Patient states his PCP won't do anything else for him and told him he needs to go back to the allergist who gave him the prescriptions to begin with. I looked through patient's chart. Patient was see by Margaret Rock two times, 07/18/2019 and 08/08/2019. Patient states he was coming in every 2 or 3 months getting a shot for his allergies. I informed patient we have never given him allergy injections. Patient stated he had gotten shots from us. Again I informed him I have his chart open in front of me and he has never received any allergy injections from our office. I told patient he has not been seen for over 3 years by our office. Patient then stated so when I was seen there I was not given a plan for my allergies? I told him he had been and read this part of Milka's note: Follow Up:6 months and as needed     Total time sent with patient 30 minutes with greater than 50% in patient education     (Please note that portions of this note may have been completed with a voice recognition program.  Efforts were made to edit the dictation but occasionally words are mis-transcribed.)        Patient to get Auvi-Q filled  Patient to take Singulair as prescribed  Patient to take antihistamine and fluticasone nasal spray as prescribed  Patient return to clinic as needed and in 6 months for reevaluation of allergies and continuation of Singulair     Signed:  Margaret Rock APRN - CNP  8/8/2019  3:11 PM    Told patient this would have been his allergy plan last. She does not even mention anything regarding patient receiving any allergy injections. Patient said Rossy byrne for telling me all the documentation she did but what can I do about my allergies now? \" I informed patient since he has not been seen in over 3 years [facility] and currently she is booking new patient appointments almost a year out. Patient then said \"So what am I supposed to do in the meantime? \" I told him since he has not been seen by our office in over 3 years I would not be able to tell him what he should do other than recommend trying over the counter allergy medications. Patient said he has already tried all of them and none of them work. He continued several times to go back to the fact he believes he had came to our office several appointments and received allergy injections. Ever time I again informed him he has never received allergy injections from us. Then patient stated maybe we had given him steroid injections and not allergy injections. I told him there is no documentation showing we ever gave him any steroid injections either. Again patient argued with me stating \"Well, again, thanks for telling me what the documentation says but that doesn't help me now. \" I told patient I was sorry I couldn't do more for him at this time but he is not established with us so I cannot do any thing else besides make him an appointment with [facility]. Patient was getting irate and started cussing at me about the situation. I asked him to please not cuss at me and then he began calling me names and making comments about what my  should do to me when I got home and at that point I told him I was now going to hang up the phone on him. NADINE Feliciano happened to come back to the clinical area during this phone call and part way through I placed the call on speaker phone so Kassidy Leong could hear how the patient was speaking to me.

## 2023-04-20 ENCOUNTER — HOSPITAL ENCOUNTER (EMERGENCY)
Age: 33
Discharge: HOME | End: 2023-04-20
Payer: COMMERCIAL

## 2023-04-20 VITALS
SYSTOLIC BLOOD PRESSURE: 141 MMHG | RESPIRATION RATE: 16 BRPM | HEIGHT: 73 IN | BODY MASS INDEX: 24.52 KG/M2 | WEIGHT: 185 LBS | HEART RATE: 94 BPM | DIASTOLIC BLOOD PRESSURE: 92 MMHG | OXYGEN SATURATION: 98 % | TEMPERATURE: 98.1 F

## 2023-04-20 DIAGNOSIS — M79.604 BILATERAL LEG PAIN: Primary | ICD-10-CM

## 2023-04-20 DIAGNOSIS — M79.605 BILATERAL LEG PAIN: Primary | ICD-10-CM

## 2023-04-20 PROCEDURE — 99213 OFFICE O/P EST LOW 20 MIN: CPT | Performed by: NURSE PRACTITIONER

## 2023-04-20 PROCEDURE — 99213 OFFICE O/P EST LOW 20 MIN: CPT

## 2023-04-20 RX ORDER — IBUPROFEN 800 MG/1
800 TABLET ORAL EVERY 8 HOURS PRN
Qty: 30 TABLET | Refills: 0 | Status: SHIPPED | OUTPATIENT
Start: 2023-04-20 | End: 2023-04-30

## 2023-04-20 RX ORDER — CYCLOBENZAPRINE HCL 5 MG
5 TABLET ORAL 3 TIMES DAILY PRN
Qty: 15 TABLET | Refills: 0 | Status: SHIPPED | OUTPATIENT
Start: 2023-04-20 | End: 2023-04-25

## 2023-04-20 RX ORDER — METHYLPREDNISOLONE 4 MG/1
TABLET ORAL
Qty: 1 KIT | Refills: 0 | Status: SHIPPED | OUTPATIENT
Start: 2023-04-20 | End: 2023-04-26

## 2023-04-20 ASSESSMENT — ENCOUNTER SYMPTOMS
SHORTNESS OF BREATH: 0
COLOR CHANGE: 0

## 2023-04-20 ASSESSMENT — PAIN DESCRIPTION - FREQUENCY: FREQUENCY: CONTINUOUS

## 2023-04-20 ASSESSMENT — PAIN SCALES - GENERAL: PAINLEVEL_OUTOF10: 7

## 2023-04-20 ASSESSMENT — PAIN DESCRIPTION - ORIENTATION: ORIENTATION: RIGHT;LEFT

## 2023-04-20 ASSESSMENT — PAIN DESCRIPTION - ONSET: ONSET: GRADUAL

## 2023-04-20 ASSESSMENT — PAIN - FUNCTIONAL ASSESSMENT: PAIN_FUNCTIONAL_ASSESSMENT: 0-10

## 2023-04-20 ASSESSMENT — PAIN DESCRIPTION - PAIN TYPE: TYPE: ACUTE PAIN

## 2023-04-20 ASSESSMENT — PAIN DESCRIPTION - DESCRIPTORS: DESCRIPTORS: THROBBING;SORE

## 2023-04-20 ASSESSMENT — PAIN DESCRIPTION - LOCATION: LOCATION: LEG

## 2023-04-20 NOTE — ED PROVIDER NOTES
[address]  Urgent Care Encounter       CHIEF COMPLAINT       Chief Complaint   Patient presents with    Leg Pain     Bilateral lower leg pain       Nurses Notes reviewed and I agree except as noted in the HPI. HISTORY OF PRESENT ILLNESS   Roya Martin is a 34 y.o. male who presents with complaints of bilateral lower leg discomfort posteriorly. Patient states he did a leg workout on Sunday. He believes he may have \"strained or stretch something\". Complains of lower leg pain that is tender to touch and severe pain went extending his leg. He has tried [employer] only. The treatment has been ineffective. He denies any numbness or tingling. Admits to discomfort when walking. Denies any trauma or known injury. The history is provided by the patient. REVIEW OF SYSTEMS     Review of Systems   Constitutional:  Negative for activity change and fever. Respiratory:  Negative for shortness of breath. Cardiovascular:  Negative for leg swelling. Musculoskeletal:  Positive for gait problem and myalgias (bilateral calfs). Negative for arthralgias and joint swelling. Skin:  Negative for color change, rash and wound. Neurological:  Negative for weakness and numbness. PAST MEDICAL HISTORY   No past medical history on file. SURGICALHISTORY     Patient  has a past surgical history that includes Tibia fracture surgery (Right); back surgery; Tonsillectomy (N/A, 7/22/2019); Tibia fracture surgery (Right, 2008); incision and drainage (Left, 5/23/2021); and Facial Surgery (N/A, 5/23/2021).     CURRENT MEDICATIONS       Previous Medications    MULTIPLE VITAMINS-MINERALS (THERAPEUTIC MULTIVITAMIN-MINERALS) TABLET    Take 1 tablet by mouth daily       ALLERGIES     Patient is is allergic to bee venom, morphine, and seasonal.    Patients   Immunization History   Administered Date(s) Administered    TDaP, ADACEL (age 10y-63y), BOOSTRIX (age 10y+), IM, 0.5mL 05/23/2021       FAMILY HISTORY

## (undated) DEVICE — BASIC SINGLE BASIN BTC-LF: Brand: MEDLINE INDUSTRIES, INC.

## (undated) DEVICE — PENCIL SMK EVAC ALL IN 1 DSGN ENH VISIBILITY IMPROVED AIR

## (undated) DEVICE — NEEDLE SPNL L3.5IN DIA25GA QNCKE TYP BVL SPINOCAN

## (undated) DEVICE — 450 ML BOTTLE OF 0.05% CHLORHEXIDINE GLUCONATE IN 99.95% STERILE WATER FOR IRRIGATION, USP AND APPLICATOR.: Brand: IRRISEPT ANTIMICROBIAL WOUND LAVAGE

## (undated) DEVICE — PACK PROCEDURE SURG SET UP SRMC

## (undated) DEVICE — BANDAGE COMPR W4INXL12FT E DISP ESMARCH EVEN

## (undated) DEVICE — SPLINT ORTH W4XL30IN WHT FBRGLS 1 SIDE FELT PD CNFRM LO

## (undated) DEVICE — TUBING, SUCTION, 1/4" X 12', STRAIGHT: Brand: MEDLINE

## (undated) DEVICE — SPONGE LAP W18XL18IN WHT COT 4 PLY FLD STRUNG RADPQ DISP ST

## (undated) DEVICE — 1010 S-DRAPE TOWEL DRAPE 10/BX: Brand: STERI-DRAPE™

## (undated) DEVICE — PADDING,UNDERCAST,COTTON, 4"X4YD STERILE: Brand: MEDLINE

## (undated) DEVICE — SOLUTION IV 1000ML 0.9% SOD CHL PH 5 INJ USP VIAFLX PLAS

## (undated) DEVICE — BANDAGE,GAUZE,4.5"X4.1YD,STERILE,LF: Brand: MEDLINE

## (undated) DEVICE — SPONGE SURG M W7/8IN TNSL WHT COT BALL DBL STRUNG RADPQ

## (undated) DEVICE — STANDARD 4-PORT MANIFOLD

## (undated) DEVICE — SYRINGE MED 10ML TRNSLUC BRL PLUNG BLK MRK POLYPR CTRL

## (undated) DEVICE — PACK,SET UP,NO DRAPES: Brand: MEDLINE

## (undated) DEVICE — DRAPE,EXTREMITY,89X128,STERILE: Brand: MEDLINE

## (undated) DEVICE — GAUZE,SPONGE,8"X4",12PLY,XRAY,STRL,LF: Brand: MEDLINE

## (undated) DEVICE — SUCTION COAGULATOR, FOOTSWITCHING, 10 FR, 6 INCH (15.24CM): Brand: MEGADYNE

## (undated) DEVICE — SOLUTION IV 500ML 0.9% SOD CHL PH 5 INJ USP VIAFLX PLAS

## (undated) DEVICE — CATHETER,URETHRAL,REDRUBBER,STRL,12FR: Brand: MEDLINE INDUSTRIES, INC.

## (undated) DEVICE — GLOVE SURG SZ 75 L12IN THK75MIL DK GRN LTX FREE

## (undated) DEVICE — CATHETER,URETHRAL,REDRUBBER,STRL,10FR: Brand: MEDLINE INDUSTRIES, INC.

## (undated) DEVICE — ELECTROSURGICAL PENCIL BUTTON SWITCH E-Z CLEAN COATED BLADE ELECTRODE 10 FT (3 M) CORD HOLSTER: Brand: MEGADYNE

## (undated) DEVICE — DUAL LUMEN STOMACH TUBE: Brand: SALEM SUMP

## (undated) DEVICE — PACK-MAJOR

## (undated) DEVICE — SPONGE GZ W4XL4IN COT 12 PLY TYP VII WVN C FLD DSGN

## (undated) DEVICE — SUTURE VIC + LEN CP1 0 70CM VCP267H

## (undated) DEVICE — GARMENT,MEDLINE,DVT,INT,CALF,MED, GEN2: Brand: MEDLINE

## (undated) DEVICE — GOWN,SIRUS,NONRNF,SETINSLV,XL,20/CS: Brand: MEDLINE

## (undated) DEVICE — PAD,ABDOMINAL,5"X9",ST,LF,25/BX: Brand: MEDLINE INDUSTRIES, INC.

## (undated) DEVICE — TOWEL,OR,DSP,ST,BLUE,STD,4/PK,20PK/CS: Brand: MEDLINE

## (undated) DEVICE — SUTURE VCRL + SZ 2-0 L27IN ABSRB UD CP-1 1/2 CIR REV CUT VCP266H

## (undated) DEVICE — DRESSING,GAUZE,XEROFORM,CURAD,5"X9",ST: Brand: CURAD

## (undated) DEVICE — BANDAGE COMPR E SGL LAYERED CLSR BGE W/ CLP W4INXL15FT

## (undated) DEVICE — GLOVE ORANGE PI 7 1/2   MSG9075

## (undated) DEVICE — SURE SET SINGLE BASIN-LF: Brand: MEDLINE INDUSTRIES, INC.

## (undated) DEVICE — SLING ARM SM W75XL138IN POLY COT DP L PKT DBL D RNG HK

## (undated) DEVICE — BANDAGE GZ W2XL75IN ST RAYON POLY CNFRM STRTCH LTWT